# Patient Record
Sex: MALE | Race: WHITE | Employment: OTHER | URBAN - METROPOLITAN AREA
[De-identification: names, ages, dates, MRNs, and addresses within clinical notes are randomized per-mention and may not be internally consistent; named-entity substitution may affect disease eponyms.]

---

## 2017-03-20 PROBLEM — E66.9 DIABETES MELLITUS TYPE 2 IN OBESE (HCC): Status: ACTIVE | Noted: 2017-03-20

## 2017-03-20 PROBLEM — R94.31 ABNORMAL EKG: Status: ACTIVE | Noted: 2017-03-20

## 2017-03-20 PROBLEM — E78.2 MIXED HYPERLIPIDEMIA: Status: ACTIVE | Noted: 2017-03-20

## 2017-03-20 PROBLEM — E11.69 DIABETES MELLITUS TYPE 2 IN OBESE (HCC): Status: ACTIVE | Noted: 2017-03-20

## 2017-03-20 PROBLEM — Z01.810 PREOPERATIVE CARDIOVASCULAR EXAMINATION: Status: ACTIVE | Noted: 2017-03-20

## 2019-04-17 PROBLEM — I48.0 PAF (PAROXYSMAL ATRIAL FIBRILLATION) (HCC): Status: ACTIVE | Noted: 2019-04-17

## 2019-04-17 PROBLEM — Z99.89 OSA ON CPAP: Status: ACTIVE | Noted: 2019-04-17

## 2019-04-17 PROBLEM — G47.33 OSA ON CPAP: Status: ACTIVE | Noted: 2019-04-17

## 2020-01-01 ENCOUNTER — APPOINTMENT (OUTPATIENT)
Dept: ULTRASOUND IMAGING | Facility: HOSPITAL | Age: 65
DRG: 682 | End: 2020-01-01
Attending: HOSPITALIST
Payer: COMMERCIAL

## 2020-01-01 ENCOUNTER — TELEPHONE (OUTPATIENT)
Dept: HEMATOLOGY/ONCOLOGY | Facility: HOSPITAL | Age: 65
End: 2020-01-01

## 2020-01-01 ENCOUNTER — APPOINTMENT (OUTPATIENT)
Dept: GENERAL RADIOLOGY | Facility: HOSPITAL | Age: 65
DRG: 435 | End: 2020-01-01
Attending: HOSPITALIST
Payer: COMMERCIAL

## 2020-01-01 ENCOUNTER — APPOINTMENT (OUTPATIENT)
Dept: GENETICS | Facility: HOSPITAL | Age: 65
End: 2020-01-01
Payer: COMMERCIAL

## 2020-01-01 ENCOUNTER — LAB REQUISITION (OUTPATIENT)
Dept: LAB | Facility: HOSPITAL | Age: 65
End: 2020-01-01
Payer: COMMERCIAL

## 2020-01-01 ENCOUNTER — HOSPITAL ENCOUNTER (INPATIENT)
Facility: HOSPITAL | Age: 65
LOS: 13 days | Discharge: HOME HEALTH | DRG: 682 | End: 2020-01-01
Attending: EMERGENCY MEDICINE | Admitting: HOSPITALIST
Payer: COMMERCIAL

## 2020-01-01 ENCOUNTER — APPOINTMENT (OUTPATIENT)
Dept: ULTRASOUND IMAGING | Facility: HOSPITAL | Age: 65
DRG: 871 | End: 2020-01-01
Attending: INTERNAL MEDICINE
Payer: COMMERCIAL

## 2020-01-01 ENCOUNTER — HOSPITAL ENCOUNTER (INPATIENT)
Facility: HOSPITAL | Age: 65
LOS: 7 days | Discharge: HOME HEALTH | DRG: 435 | End: 2020-01-01
Attending: EMERGENCY MEDICINE | Admitting: HOSPITALIST
Payer: COMMERCIAL

## 2020-01-01 ENCOUNTER — HOSPITAL ENCOUNTER (INPATIENT)
Facility: HOSPITAL | Age: 65
LOS: 4 days | Discharge: HOME | DRG: 871 | End: 2020-01-01
Attending: EMERGENCY MEDICINE | Admitting: HOSPITALIST
Payer: COMMERCIAL

## 2020-01-01 ENCOUNTER — APPOINTMENT (OUTPATIENT)
Dept: GENERAL RADIOLOGY | Facility: HOSPITAL | Age: 65
DRG: 682 | End: 2020-01-01
Attending: INTERNAL MEDICINE
Payer: COMMERCIAL

## 2020-01-01 ENCOUNTER — APPOINTMENT (OUTPATIENT)
Dept: CT IMAGING | Facility: HOSPITAL | Age: 65
DRG: 682 | End: 2020-01-01
Attending: HOSPITALIST
Payer: COMMERCIAL

## 2020-01-01 ENCOUNTER — APPOINTMENT (OUTPATIENT)
Dept: INTERVENTIONAL RADIOLOGY/VASCULAR | Facility: HOSPITAL | Age: 65
DRG: 435 | End: 2020-01-01
Attending: CLINICAL NURSE SPECIALIST
Payer: COMMERCIAL

## 2020-01-01 ENCOUNTER — LAB ENCOUNTER (OUTPATIENT)
Dept: LAB | Age: 65
End: 2020-01-01
Attending: INTERNAL MEDICINE
Payer: COMMERCIAL

## 2020-01-01 ENCOUNTER — NURSE ONLY (OUTPATIENT)
Dept: HEMATOLOGY/ONCOLOGY | Facility: HOSPITAL | Age: 65
End: 2020-01-01
Payer: COMMERCIAL

## 2020-01-01 ENCOUNTER — GENETICS ENCOUNTER (OUTPATIENT)
Dept: GENETICS | Facility: HOSPITAL | Age: 65
End: 2020-01-01
Payer: COMMERCIAL

## 2020-01-01 VITALS
HEIGHT: 75 IN | DIASTOLIC BLOOD PRESSURE: 61 MMHG | OXYGEN SATURATION: 97 % | RESPIRATION RATE: 18 BRPM | HEART RATE: 82 BPM | WEIGHT: 209.88 LBS | SYSTOLIC BLOOD PRESSURE: 105 MMHG | BODY MASS INDEX: 26.1 KG/M2 | TEMPERATURE: 98 F

## 2020-01-01 VITALS
HEIGHT: 75 IN | BODY MASS INDEX: 26.49 KG/M2 | OXYGEN SATURATION: 96 % | DIASTOLIC BLOOD PRESSURE: 92 MMHG | WEIGHT: 213.06 LBS | HEART RATE: 77 BPM | SYSTOLIC BLOOD PRESSURE: 150 MMHG | RESPIRATION RATE: 18 BRPM | TEMPERATURE: 98 F

## 2020-01-01 VITALS
SYSTOLIC BLOOD PRESSURE: 106 MMHG | TEMPERATURE: 99 F | HEIGHT: 75 IN | RESPIRATION RATE: 16 BRPM | WEIGHT: 214.31 LBS | DIASTOLIC BLOOD PRESSURE: 57 MMHG | HEART RATE: 73 BPM | BODY MASS INDEX: 26.65 KG/M2 | OXYGEN SATURATION: 96 %

## 2020-01-01 DIAGNOSIS — L03.116 CELLULITIS OF LEFT LOWER EXTREMITY: Primary | ICD-10-CM

## 2020-01-01 DIAGNOSIS — R63.8 DECREASED ORAL INTAKE: ICD-10-CM

## 2020-01-01 DIAGNOSIS — R52 INTRACTABLE PAIN: Primary | ICD-10-CM

## 2020-01-01 DIAGNOSIS — N30.00 ACUTE CYSTITIS WITHOUT HEMATURIA: ICD-10-CM

## 2020-01-01 DIAGNOSIS — L03.119 CELLULITIS OF LOWER EXTREMITY, UNSPECIFIED LATERALITY: ICD-10-CM

## 2020-01-01 DIAGNOSIS — Z79.2 LONG TERM (CURRENT) USE OF ANTIBIOTICS: ICD-10-CM

## 2020-01-01 DIAGNOSIS — C25.9 MALIGNANT NEOPLASM OF PANCREAS, UNSPECIFIED LOCATION OF MALIGNANCY (HCC): ICD-10-CM

## 2020-01-01 DIAGNOSIS — D64.9 ANEMIA, UNSPECIFIED TYPE: ICD-10-CM

## 2020-01-01 DIAGNOSIS — R18.0 ASCITES, MALIGNANT: ICD-10-CM

## 2020-01-01 DIAGNOSIS — N17.9 ACUTE KIDNEY INJURY (HCC): ICD-10-CM

## 2020-01-01 DIAGNOSIS — N17.9 ACUTE KIDNEY FAILURE, UNSPECIFIED (HCC): Primary | ICD-10-CM

## 2020-01-01 DIAGNOSIS — N17.9 AKI (ACUTE KIDNEY INJURY) (HCC): Primary | ICD-10-CM

## 2020-01-01 LAB
ALBUMIN SERPL-MCNC: 1.5 G/DL (ref 3.4–5)
ALBUMIN SERPL-MCNC: 1.6 G/DL (ref 3.4–5)
ALBUMIN SERPL-MCNC: 1.7 G/DL (ref 3.4–5)
ALBUMIN SERPL-MCNC: 1.9 G/DL (ref 3.4–5)
ALBUMIN SERPL-MCNC: 2 G/DL (ref 3.4–5)
ALBUMIN SERPL-MCNC: 2 G/DL (ref 3.4–5)
ALBUMIN SERPL-MCNC: 2.1 G/DL (ref 3.4–5)
ALBUMIN SERPL-MCNC: 2.2 G/DL (ref 3.4–5)
ALBUMIN SERPL-MCNC: 2.2 G/DL (ref 3.4–5)
ALBUMIN SERPL-MCNC: 2.3 G/DL (ref 3.4–5)
ALBUMIN SERPL-MCNC: 2.3 G/DL (ref 3.4–5)
ALBUMIN SERPL-MCNC: 2.4 G/DL (ref 3.4–5)
ALBUMIN SERPL-MCNC: 2.5 G/DL (ref 3.4–5)
ALBUMIN SERPL-MCNC: 2.5 G/DL (ref 3.4–5)
ALBUMIN/GLOB SERPL: 0.4 {RATIO} (ref 1–2)
ALBUMIN/GLOB SERPL: 0.4 {RATIO} (ref 1–2)
ALBUMIN/GLOB SERPL: 0.5 {RATIO} (ref 1–2)
ALBUMIN/GLOB SERPL: 0.6 {RATIO} (ref 1–2)
ALBUMIN/GLOB SERPL: 0.7 {RATIO} (ref 1–2)
ALP LIVER SERPL-CCNC: 238 U/L (ref 45–117)
ALP LIVER SERPL-CCNC: 239 U/L (ref 45–117)
ALP LIVER SERPL-CCNC: 300 U/L (ref 45–117)
ALP LIVER SERPL-CCNC: 313 U/L (ref 45–117)
ALP LIVER SERPL-CCNC: 370 U/L (ref 45–117)
ALP LIVER SERPL-CCNC: 397 U/L (ref 45–117)
ALP LIVER SERPL-CCNC: 479 U/L (ref 45–117)
ALP LIVER SERPL-CCNC: 521 U/L (ref 45–117)
ALP LIVER SERPL-CCNC: 663 U/L (ref 45–117)
ALT SERPL-CCNC: 13 U/L (ref 16–61)
ALT SERPL-CCNC: 13 U/L (ref 16–61)
ALT SERPL-CCNC: 27 U/L (ref 16–61)
ALT SERPL-CCNC: 30 U/L (ref 16–61)
ALT SERPL-CCNC: 35 U/L (ref 16–61)
ALT SERPL-CCNC: 36 U/L (ref 16–61)
ALT SERPL-CCNC: 36 U/L (ref 16–61)
ALT SERPL-CCNC: 38 U/L (ref 16–61)
ALT SERPL-CCNC: 47 U/L (ref 16–61)
ANION GAP SERPL CALC-SCNC: 10 MMOL/L (ref 0–18)
ANION GAP SERPL CALC-SCNC: 11 MMOL/L (ref 0–18)
ANION GAP SERPL CALC-SCNC: 12 MMOL/L (ref 0–18)
ANION GAP SERPL CALC-SCNC: 5 MMOL/L (ref 0–18)
ANION GAP SERPL CALC-SCNC: 6 MMOL/L (ref 0–18)
ANION GAP SERPL CALC-SCNC: 6 MMOL/L (ref 0–18)
ANION GAP SERPL CALC-SCNC: 7 MMOL/L (ref 0–18)
ANION GAP SERPL CALC-SCNC: 8 MMOL/L (ref 0–18)
ANION GAP SERPL CALC-SCNC: 9 MMOL/L (ref 0–18)
ANTIBODY SCREEN: NEGATIVE
APTT PPP: 101.4 SECONDS (ref 23.2–35.3)
APTT PPP: 127.9 SECONDS (ref 23.2–35.3)
APTT PPP: 150.4 SECONDS (ref 23.2–35.3)
APTT PPP: 153.3 SECONDS (ref 23.2–35.3)
APTT PPP: 40.5 SECONDS (ref 23.2–35.3)
APTT PPP: 41.1 SECONDS (ref 23.2–35.3)
APTT PPP: 52.7 SECONDS (ref 23.2–35.3)
APTT PPP: 66.4 SECONDS (ref 23.2–35.3)
APTT PPP: 71.5 SECONDS (ref 23.2–35.3)
APTT PPP: 71.7 SECONDS (ref 23.2–35.3)
APTT PPP: 73.7 SECONDS (ref 23.2–35.3)
APTT PPP: 74 SECONDS (ref 23.2–35.3)
APTT PPP: 78.9 SECONDS (ref 23.2–35.3)
APTT PPP: 83.6 SECONDS (ref 23.2–35.3)
APTT PPP: 87.8 SECONDS (ref 23.2–35.3)
APTT PPP: 92 SECONDS (ref 23.2–35.3)
APTT PPP: 93.2 SECONDS (ref 23.2–35.3)
APTT PPP: 93.8 SECONDS (ref 23.2–35.3)
APTT PPP: 94.5 SECONDS (ref 23.2–35.3)
APTT PPP: 94.6 SECONDS (ref 23.2–35.3)
AST SERPL-CCNC: 18 U/L (ref 15–37)
AST SERPL-CCNC: 25 U/L (ref 15–37)
AST SERPL-CCNC: 29 U/L (ref 15–37)
AST SERPL-CCNC: 34 U/L (ref 15–37)
AST SERPL-CCNC: 39 U/L (ref 15–37)
AST SERPL-CCNC: 46 U/L (ref 15–37)
AST SERPL-CCNC: 50 U/L (ref 15–37)
AST SERPL-CCNC: 50 U/L (ref 15–37)
AST SERPL-CCNC: 65 U/L (ref 15–37)
BACTERIA UR QL AUTO: NEGATIVE /HPF
BASOPHILS # BLD AUTO: 0 X10(3) UL (ref 0–0.2)
BASOPHILS # BLD AUTO: 0.01 X10(3) UL (ref 0–0.2)
BASOPHILS # BLD AUTO: 0.02 X10(3) UL (ref 0–0.2)
BASOPHILS # BLD AUTO: 0.03 X10(3) UL (ref 0–0.2)
BASOPHILS # BLD AUTO: 0.03 X10(3) UL (ref 0–0.2)
BASOPHILS # BLD AUTO: 0.04 X10(3) UL (ref 0–0.2)
BASOPHILS # BLD AUTO: 0.05 X10(3) UL (ref 0–0.2)
BASOPHILS # BLD AUTO: 0.06 X10(3) UL (ref 0–0.2)
BASOPHILS # BLD AUTO: 0.07 X10(3) UL (ref 0–0.2)
BASOPHILS NFR BLD AUTO: 0 %
BASOPHILS NFR BLD AUTO: 0.1 %
BASOPHILS NFR BLD AUTO: 0.3 %
BASOPHILS NFR BLD AUTO: 0.4 %
BASOPHILS NFR BLD AUTO: 0.4 %
BASOPHILS NFR BLD AUTO: 0.6 %
BASOPHILS NFR BLD AUTO: 0.7 %
BASOPHILS NFR BLD AUTO: 0.7 %
BASOPHILS NFR BLD AUTO: 0.8 %
BASOPHILS NFR BLD AUTO: 0.8 %
BASOPHILS NFR BLD AUTO: 0.9 %
BASOPHILS NFR BLD AUTO: 1 %
BASOPHILS NFR BLD AUTO: 1 %
BASOPHILS NFR BLD AUTO: 1.1 %
BASOPHILS NFR BLD AUTO: 1.1 %
BASOPHILS NFR BLD AUTO: 1.2 %
BASOPHILS NFR BLD AUTO: 1.3 %
BASOPHILS NFR BLD AUTO: 1.5 %
BASOPHILS NFR FLD: 0 %
BASOPHILS NFR FLD: 0 %
BILIRUB DIRECT SERPL-MCNC: 0.5 MG/DL (ref 0–0.2)
BILIRUB SERPL-MCNC: 0.6 MG/DL (ref 0.1–2)
BILIRUB SERPL-MCNC: 0.6 MG/DL (ref 0.1–2)
BILIRUB SERPL-MCNC: 0.7 MG/DL (ref 0.1–2)
BILIRUB SERPL-MCNC: 0.9 MG/DL (ref 0.1–2)
BILIRUB SERPL-MCNC: 1.1 MG/DL (ref 0.1–2)
BILIRUB SERPL-MCNC: 1.2 MG/DL (ref 0.1–2)
BILIRUB SERPL-MCNC: 1.3 MG/DL (ref 0.1–2)
BILIRUB SERPL-MCNC: 1.4 MG/DL (ref 0.1–2)
BILIRUB SERPL-MCNC: 1.5 MG/DL (ref 0.1–2)
BILIRUB UR QL: NEGATIVE
BLOOD TYPE BARCODE: 5100
BLOOD TYPE BARCODE: 9500
BUN BLD-MCNC: 18 MG/DL (ref 7–18)
BUN BLD-MCNC: 18 MG/DL (ref 7–18)
BUN BLD-MCNC: 25 MG/DL (ref 7–18)
BUN BLD-MCNC: 26 MG/DL (ref 7–18)
BUN BLD-MCNC: 28 MG/DL (ref 7–18)
BUN BLD-MCNC: 36 MG/DL (ref 7–18)
BUN BLD-MCNC: 37 MG/DL (ref 7–18)
BUN BLD-MCNC: 38 MG/DL (ref 7–18)
BUN BLD-MCNC: 38 MG/DL (ref 7–18)
BUN BLD-MCNC: 39 MG/DL (ref 7–18)
BUN BLD-MCNC: 41 MG/DL (ref 7–18)
BUN BLD-MCNC: 43 MG/DL (ref 7–18)
BUN BLD-MCNC: 45 MG/DL (ref 7–18)
BUN BLD-MCNC: 45 MG/DL (ref 7–18)
BUN BLD-MCNC: 48 MG/DL (ref 7–18)
BUN BLD-MCNC: 49 MG/DL (ref 7–18)
BUN BLD-MCNC: 53 MG/DL (ref 7–18)
BUN BLD-MCNC: 54 MG/DL (ref 7–18)
BUN BLD-MCNC: 56 MG/DL (ref 7–18)
BUN/CREAT SERPL: 10.5 (ref 10–20)
BUN/CREAT SERPL: 10.7 (ref 10–20)
BUN/CREAT SERPL: 11.4 (ref 10–20)
BUN/CREAT SERPL: 12.3 (ref 10–20)
BUN/CREAT SERPL: 12.6 (ref 10–20)
BUN/CREAT SERPL: 12.8 (ref 10–20)
BUN/CREAT SERPL: 14.5 (ref 10–20)
BUN/CREAT SERPL: 14.5 (ref 10–20)
BUN/CREAT SERPL: 14.9 (ref 10–20)
BUN/CREAT SERPL: 15.8 (ref 10–20)
BUN/CREAT SERPL: 15.8 (ref 10–20)
BUN/CREAT SERPL: 17 (ref 10–20)
BUN/CREAT SERPL: 19.4 (ref 10–20)
BUN/CREAT SERPL: 19.6 (ref 10–20)
BUN/CREAT SERPL: 19.7 (ref 10–20)
BUN/CREAT SERPL: 20.3 (ref 10–20)
BUN/CREAT SERPL: 20.3 (ref 10–20)
BUN/CREAT SERPL: 23.4 (ref 10–20)
BUN/CREAT SERPL: 30.1 (ref 10–20)
BUN/CREAT SERPL: 33.3 (ref 10–20)
BUN/CREAT SERPL: 8.1 (ref 10–20)
BUN/CREAT SERPL: 8.1 (ref 10–20)
BUN/CREAT SERPL: 8.2 (ref 10–20)
BUN/CREAT SERPL: 8.3 (ref 10–20)
BUN/CREAT SERPL: 8.6 (ref 10–20)
BUN/CREAT SERPL: 8.7 (ref 10–20)
BUN/CREAT SERPL: 8.9 (ref 10–20)
BUN/CREAT SERPL: 8.9 (ref 10–20)
BUN/CREAT SERPL: 9.3 (ref 10–20)
BUN/CREAT SERPL: 9.4 (ref 10–20)
BUN/CREAT SERPL: 9.7 (ref 10–20)
BUN/CREAT SERPL: 9.9 (ref 10–20)
C DIFF TOX B STL QL: NEGATIVE
C DIFF TOX B STL QL: POSITIVE
CALCIUM BLD-MCNC: 7.7 MG/DL (ref 8.5–10.1)
CALCIUM BLD-MCNC: 7.8 MG/DL (ref 8.5–10.1)
CALCIUM BLD-MCNC: 7.8 MG/DL (ref 8.5–10.1)
CALCIUM BLD-MCNC: 7.9 MG/DL (ref 8.5–10.1)
CALCIUM BLD-MCNC: 8 MG/DL (ref 8.5–10.1)
CALCIUM BLD-MCNC: 8.1 MG/DL (ref 8.5–10.1)
CALCIUM BLD-MCNC: 8.2 MG/DL (ref 8.5–10.1)
CALCIUM BLD-MCNC: 8.3 MG/DL (ref 8.5–10.1)
CALCIUM BLD-MCNC: 8.4 MG/DL (ref 8.5–10.1)
CALCIUM BLD-MCNC: 8.5 MG/DL (ref 8.5–10.1)
CALCIUM BLD-MCNC: 9.1 MG/DL (ref 8.5–10.1)
CANCER AG19-9 SERPL-ACNC: ABNORMAL U/ML (ref ?–37)
CHLORIDE SERPL-SCNC: 100 MMOL/L (ref 98–112)
CHLORIDE SERPL-SCNC: 101 MMOL/L (ref 98–112)
CHLORIDE SERPL-SCNC: 102 MMOL/L (ref 98–112)
CHLORIDE SERPL-SCNC: 103 MMOL/L (ref 98–112)
CHLORIDE SERPL-SCNC: 104 MMOL/L (ref 98–112)
CHLORIDE SERPL-SCNC: 105 MMOL/L (ref 98–112)
CHLORIDE SERPL-SCNC: 105 MMOL/L (ref 98–112)
CHLORIDE SERPL-SCNC: 106 MMOL/L (ref 98–112)
CHLORIDE SERPL-SCNC: 107 MMOL/L (ref 98–112)
CHLORIDE SERPL-SCNC: 108 MMOL/L (ref 98–112)
CHLORIDE SERPL-SCNC: 109 MMOL/L (ref 98–112)
CHLORIDE SERPL-SCNC: 110 MMOL/L (ref 98–112)
CHLORIDE SERPL-SCNC: 99 MMOL/L (ref 98–112)
CHLORIDE SERPL-SCNC: 99 MMOL/L (ref 98–112)
CHLORIDE UR-SCNC: 15 MMOL/L
CO2 SERPL-SCNC: 16 MMOL/L (ref 21–32)
CO2 SERPL-SCNC: 18 MMOL/L (ref 21–32)
CO2 SERPL-SCNC: 19 MMOL/L (ref 21–32)
CO2 SERPL-SCNC: 19 MMOL/L (ref 21–32)
CO2 SERPL-SCNC: 20 MMOL/L (ref 21–32)
CO2 SERPL-SCNC: 21 MMOL/L (ref 21–32)
CO2 SERPL-SCNC: 23 MMOL/L (ref 21–32)
CO2 SERPL-SCNC: 24 MMOL/L (ref 21–32)
CO2 SERPL-SCNC: 25 MMOL/L (ref 21–32)
CO2 SERPL-SCNC: 26 MMOL/L (ref 21–32)
CO2 SERPL-SCNC: 27 MMOL/L (ref 21–32)
COLOR FLD: YELLOW
COLOR FLD: YELLOW
COLOR UR: YELLOW
CREAT BLD-MCNC: 0.54 MG/DL (ref 0.7–1.3)
CREAT BLD-MCNC: 0.77 MG/DL (ref 0.7–1.3)
CREAT BLD-MCNC: 0.83 MG/DL (ref 0.7–1.3)
CREAT BLD-MCNC: 1.28 MG/DL (ref 0.7–1.3)
CREAT BLD-MCNC: 2.27 MG/DL (ref 0.7–1.3)
CREAT BLD-MCNC: 2.44 MG/DL (ref 0.7–1.3)
CREAT BLD-MCNC: 2.66 MG/DL
CREAT BLD-MCNC: 2.73 MG/DL (ref 0.7–1.3)
CREAT BLD-MCNC: 2.83 MG/DL (ref 0.7–1.3)
CREAT BLD-MCNC: 2.83 MG/DL (ref 0.7–1.3)
CREAT BLD-MCNC: 2.85 MG/DL (ref 0.7–1.3)
CREAT BLD-MCNC: 2.88 MG/DL (ref 0.7–1.3)
CREAT BLD-MCNC: 2.89 MG/DL (ref 0.7–1.3)
CREAT BLD-MCNC: 3.1 MG/DL (ref 0.7–1.3)
CREAT BLD-MCNC: 3.15 MG/DL (ref 0.7–1.3)
CREAT BLD-MCNC: 3.21 MG/DL (ref 0.7–1.3)
CREAT BLD-MCNC: 3.33 MG/DL (ref 0.7–1.3)
CREAT BLD-MCNC: 3.45 MG/DL (ref 0.7–1.3)
CREAT BLD-MCNC: 3.52 MG/DL (ref 0.7–1.3)
CREAT BLD-MCNC: 3.81 MG/DL (ref 0.7–1.3)
CREAT BLD-MCNC: 3.92 MG/DL (ref 0.7–1.3)
CREAT BLD-MCNC: 4.04 MG/DL (ref 0.7–1.3)
CREAT BLD-MCNC: 4.14 MG/DL (ref 0.7–1.3)
CREAT BLD-MCNC: 4.2 MG/DL (ref 0.7–1.3)
CREAT BLD-MCNC: 4.2 MG/DL (ref 0.7–1.3)
CREAT BLD-MCNC: 4.37 MG/DL (ref 0.7–1.3)
CREAT BLD-MCNC: 4.46 MG/DL (ref 0.7–1.3)
CREAT BLD-MCNC: 4.48 MG/DL (ref 0.7–1.3)
CREAT BLD-MCNC: 4.57 MG/DL (ref 0.7–1.3)
CREAT BLD-MCNC: 4.57 MG/DL (ref 0.7–1.3)
CREAT BLD-MCNC: 4.75 MG/DL (ref 0.7–1.3)
CREAT UR-SCNC: 156 MG/DL
CREAT UR-SCNC: 184 MG/DL
CREAT UR-SCNC: 206 MG/DL
CRP SERPL-MCNC: 2.47 MG/DL (ref ?–0.3)
DEPRECATED RDW RBC AUTO: 55.4 FL (ref 35.1–46.3)
DEPRECATED RDW RBC AUTO: 57.1 FL (ref 35.1–46.3)
DEPRECATED RDW RBC AUTO: 59.7 FL (ref 35.1–46.3)
DEPRECATED RDW RBC AUTO: 61.2 FL (ref 35.1–46.3)
DEPRECATED RDW RBC AUTO: 63.7 FL (ref 35.1–46.3)
DEPRECATED RDW RBC AUTO: 63.8 FL (ref 35.1–46.3)
DEPRECATED RDW RBC AUTO: 64 FL (ref 35.1–46.3)
DEPRECATED RDW RBC AUTO: 64 FL (ref 35.1–46.3)
DEPRECATED RDW RBC AUTO: 64.3 FL (ref 35.1–46.3)
DEPRECATED RDW RBC AUTO: 64.3 FL (ref 35.1–46.3)
DEPRECATED RDW RBC AUTO: 64.5 FL (ref 35.1–46.3)
DEPRECATED RDW RBC AUTO: 64.6 FL (ref 35.1–46.3)
DEPRECATED RDW RBC AUTO: 64.6 FL (ref 35.1–46.3)
DEPRECATED RDW RBC AUTO: 64.7 FL (ref 35.1–46.3)
DEPRECATED RDW RBC AUTO: 64.9 FL (ref 35.1–46.3)
DEPRECATED RDW RBC AUTO: 64.9 FL (ref 35.1–46.3)
DEPRECATED RDW RBC AUTO: 65.1 FL (ref 35.1–46.3)
DEPRECATED RDW RBC AUTO: 66.3 FL (ref 35.1–46.3)
DEPRECATED RDW RBC AUTO: 66.4 FL (ref 35.1–46.3)
DEPRECATED RDW RBC AUTO: 66.5 FL (ref 35.1–46.3)
DEPRECATED RDW RBC AUTO: 66.6 FL (ref 35.1–46.3)
DEPRECATED RDW RBC AUTO: 66.8 FL (ref 35.1–46.3)
DEPRECATED RDW RBC AUTO: 66.8 FL (ref 35.1–46.3)
EOSINOPHIL # BLD AUTO: 0.01 X10(3) UL (ref 0–0.7)
EOSINOPHIL # BLD AUTO: 0.03 X10(3) UL (ref 0–0.7)
EOSINOPHIL # BLD AUTO: 0.04 X10(3) UL (ref 0–0.7)
EOSINOPHIL # BLD AUTO: 0.04 X10(3) UL (ref 0–0.7)
EOSINOPHIL # BLD AUTO: 0.05 X10(3) UL (ref 0–0.7)
EOSINOPHIL # BLD AUTO: 0.05 X10(3) UL (ref 0–0.7)
EOSINOPHIL # BLD AUTO: 0.06 X10(3) UL (ref 0–0.7)
EOSINOPHIL # BLD AUTO: 0.08 X10(3) UL (ref 0–0.7)
EOSINOPHIL # BLD AUTO: 0.09 X10(3) UL (ref 0–0.7)
EOSINOPHIL # BLD AUTO: 0.1 X10(3) UL (ref 0–0.7)
EOSINOPHIL # BLD AUTO: 0.14 X10(3) UL (ref 0–0.7)
EOSINOPHIL # BLD AUTO: 0.15 X10(3) UL (ref 0–0.7)
EOSINOPHIL # BLD AUTO: 0.16 X10(3) UL (ref 0–0.7)
EOSINOPHIL # BLD AUTO: 0.17 X10(3) UL (ref 0–0.7)
EOSINOPHIL # BLD AUTO: 0.18 X10(3) UL (ref 0–0.7)
EOSINOPHIL # BLD AUTO: 0.19 X10(3) UL (ref 0–0.7)
EOSINOPHIL # BLD AUTO: 0.2 X10(3) UL (ref 0–0.7)
EOSINOPHIL # BLD AUTO: 0.25 X10(3) UL (ref 0–0.7)
EOSINOPHIL # BLD AUTO: 0.25 X10(3) UL (ref 0–0.7)
EOSINOPHIL # BLD AUTO: 0.27 X10(3) UL (ref 0–0.7)
EOSINOPHIL NFR BLD AUTO: 0.1 %
EOSINOPHIL NFR BLD AUTO: 0.1 %
EOSINOPHIL NFR BLD AUTO: 0.3 %
EOSINOPHIL NFR BLD AUTO: 0.6 %
EOSINOPHIL NFR BLD AUTO: 0.6 %
EOSINOPHIL NFR BLD AUTO: 1 %
EOSINOPHIL NFR BLD AUTO: 1.1 %
EOSINOPHIL NFR BLD AUTO: 1.6 %
EOSINOPHIL NFR BLD AUTO: 1.7 %
EOSINOPHIL NFR BLD AUTO: 1.9 %
EOSINOPHIL NFR BLD AUTO: 2.1 %
EOSINOPHIL NFR BLD AUTO: 3.3 %
EOSINOPHIL NFR BLD AUTO: 3.5 %
EOSINOPHIL NFR BLD AUTO: 3.6 %
EOSINOPHIL NFR BLD AUTO: 3.7 %
EOSINOPHIL NFR BLD AUTO: 3.9 %
EOSINOPHIL NFR BLD AUTO: 4.2 %
EOSINOPHIL NFR BLD AUTO: 4.6 %
EOSINOPHIL NFR BLD AUTO: 4.8 %
EOSINOPHIL NFR BLD AUTO: 5.2 %
EOSINOPHIL NFR BLD AUTO: 5.9 %
EOSINOPHIL NFR BLD AUTO: 6.6 %
EOSINOPHIL NFR FLD: 0 %
EOSINOPHIL NFR FLD: 0 %
ERYTHROCYTE [DISTWIDTH] IN BLOOD BY AUTOMATED COUNT: 16.8 % (ref 11–15)
ERYTHROCYTE [DISTWIDTH] IN BLOOD BY AUTOMATED COUNT: 17.1 % (ref 11–15)
ERYTHROCYTE [DISTWIDTH] IN BLOOD BY AUTOMATED COUNT: 17.7 % (ref 11–15)
ERYTHROCYTE [DISTWIDTH] IN BLOOD BY AUTOMATED COUNT: 17.9 % (ref 11–15)
ERYTHROCYTE [DISTWIDTH] IN BLOOD BY AUTOMATED COUNT: 18.6 % (ref 11–15)
ERYTHROCYTE [DISTWIDTH] IN BLOOD BY AUTOMATED COUNT: 18.8 % (ref 11–15)
ERYTHROCYTE [DISTWIDTH] IN BLOOD BY AUTOMATED COUNT: 18.9 % (ref 11–15)
ERYTHROCYTE [DISTWIDTH] IN BLOOD BY AUTOMATED COUNT: 19 % (ref 11–15)
ERYTHROCYTE [DISTWIDTH] IN BLOOD BY AUTOMATED COUNT: 19.1 % (ref 11–15)
ERYTHROCYTE [DISTWIDTH] IN BLOOD BY AUTOMATED COUNT: 19.3 % (ref 11–15)
ERYTHROCYTE [DISTWIDTH] IN BLOOD BY AUTOMATED COUNT: 19.5 % (ref 11–15)
ERYTHROCYTE [DISTWIDTH] IN BLOOD BY AUTOMATED COUNT: 19.5 % (ref 11–15)
ERYTHROCYTE [DISTWIDTH] IN BLOOD BY AUTOMATED COUNT: 19.6 % (ref 11–15)
ERYTHROCYTE [DISTWIDTH] IN BLOOD BY AUTOMATED COUNT: 19.6 % (ref 11–15)
ERYTHROCYTE [DISTWIDTH] IN BLOOD BY AUTOMATED COUNT: 19.8 % (ref 11–15)
GLOBULIN PLAS-MCNC: 3.2 G/DL (ref 2.8–4.4)
GLOBULIN PLAS-MCNC: 3.6 G/DL (ref 2.8–4.4)
GLOBULIN PLAS-MCNC: 3.7 G/DL (ref 2.8–4.4)
GLOBULIN PLAS-MCNC: 3.8 G/DL (ref 2.8–4.4)
GLOBULIN PLAS-MCNC: 3.8 G/DL (ref 2.8–4.4)
GLOBULIN PLAS-MCNC: 4 G/DL (ref 2.8–4.4)
GLOBULIN PLAS-MCNC: 4.1 G/DL (ref 2.8–4.4)
GLOBULIN PLAS-MCNC: 4.2 G/DL (ref 2.8–4.4)
GLUCOSE BLD-MCNC: 109 MG/DL (ref 70–99)
GLUCOSE BLD-MCNC: 110 MG/DL (ref 70–99)
GLUCOSE BLD-MCNC: 122 MG/DL (ref 70–99)
GLUCOSE BLD-MCNC: 128 MG/DL (ref 70–99)
GLUCOSE BLD-MCNC: 140 MG/DL (ref 70–99)
GLUCOSE BLD-MCNC: 149 MG/DL (ref 70–99)
GLUCOSE BLD-MCNC: 149 MG/DL (ref 70–99)
GLUCOSE BLD-MCNC: 154 MG/DL (ref 70–99)
GLUCOSE BLD-MCNC: 157 MG/DL (ref 70–99)
GLUCOSE BLD-MCNC: 157 MG/DL (ref 70–99)
GLUCOSE BLD-MCNC: 162 MG/DL (ref 70–99)
GLUCOSE BLD-MCNC: 165 MG/DL (ref 70–99)
GLUCOSE BLD-MCNC: 170 MG/DL (ref 70–99)
GLUCOSE BLD-MCNC: 173 MG/DL (ref 70–99)
GLUCOSE BLD-MCNC: 176 MG/DL (ref 70–99)
GLUCOSE BLD-MCNC: 187 MG/DL (ref 70–99)
GLUCOSE BLD-MCNC: 191 MG/DL (ref 70–99)
GLUCOSE BLD-MCNC: 200 MG/DL (ref 70–99)
GLUCOSE BLD-MCNC: 204 MG/DL (ref 70–99)
GLUCOSE BLD-MCNC: 228 MG/DL (ref 70–99)
GLUCOSE BLD-MCNC: 230 MG/DL (ref 70–99)
GLUCOSE BLD-MCNC: 242 MG/DL (ref 70–99)
GLUCOSE BLD-MCNC: 51 MG/DL (ref 70–99)
GLUCOSE BLD-MCNC: 59 MG/DL (ref 70–99)
GLUCOSE BLD-MCNC: 79 MG/DL (ref 70–99)
GLUCOSE BLD-MCNC: 82 MG/DL (ref 70–99)
GLUCOSE BLD-MCNC: 83 MG/DL (ref 70–99)
GLUCOSE BLD-MCNC: 94 MG/DL (ref 70–99)
GLUCOSE BLD-MCNC: 97 MG/DL (ref 70–99)
GLUCOSE BLDC GLUCOMTR-MCNC: 102 MG/DL (ref 70–99)
GLUCOSE BLDC GLUCOMTR-MCNC: 106 MG/DL (ref 70–99)
GLUCOSE BLDC GLUCOMTR-MCNC: 116 MG/DL (ref 70–99)
GLUCOSE BLDC GLUCOMTR-MCNC: 117 MG/DL (ref 70–99)
GLUCOSE BLDC GLUCOMTR-MCNC: 130 MG/DL (ref 70–99)
GLUCOSE BLDC GLUCOMTR-MCNC: 153 MG/DL (ref 70–99)
GLUCOSE BLDC GLUCOMTR-MCNC: 154 MG/DL (ref 70–99)
GLUCOSE BLDC GLUCOMTR-MCNC: 156 MG/DL (ref 70–99)
GLUCOSE BLDC GLUCOMTR-MCNC: 157 MG/DL (ref 70–99)
GLUCOSE BLDC GLUCOMTR-MCNC: 164 MG/DL (ref 70–99)
GLUCOSE BLDC GLUCOMTR-MCNC: 175 MG/DL (ref 70–99)
GLUCOSE BLDC GLUCOMTR-MCNC: 175 MG/DL (ref 70–99)
GLUCOSE BLDC GLUCOMTR-MCNC: 177 MG/DL (ref 70–99)
GLUCOSE BLDC GLUCOMTR-MCNC: 178 MG/DL (ref 70–99)
GLUCOSE BLDC GLUCOMTR-MCNC: 179 MG/DL (ref 70–99)
GLUCOSE BLDC GLUCOMTR-MCNC: 183 MG/DL (ref 70–99)
GLUCOSE BLDC GLUCOMTR-MCNC: 185 MG/DL (ref 70–99)
GLUCOSE BLDC GLUCOMTR-MCNC: 185 MG/DL (ref 70–99)
GLUCOSE BLDC GLUCOMTR-MCNC: 186 MG/DL (ref 70–99)
GLUCOSE BLDC GLUCOMTR-MCNC: 187 MG/DL (ref 70–99)
GLUCOSE BLDC GLUCOMTR-MCNC: 190 MG/DL (ref 70–99)
GLUCOSE BLDC GLUCOMTR-MCNC: 194 MG/DL (ref 70–99)
GLUCOSE BLDC GLUCOMTR-MCNC: 203 MG/DL (ref 70–99)
GLUCOSE BLDC GLUCOMTR-MCNC: 208 MG/DL (ref 70–99)
GLUCOSE BLDC GLUCOMTR-MCNC: 210 MG/DL (ref 70–99)
GLUCOSE BLDC GLUCOMTR-MCNC: 219 MG/DL (ref 70–99)
GLUCOSE BLDC GLUCOMTR-MCNC: 238 MG/DL (ref 70–99)
GLUCOSE BLDC GLUCOMTR-MCNC: 246 MG/DL (ref 70–99)
GLUCOSE BLDC GLUCOMTR-MCNC: 252 MG/DL (ref 70–99)
GLUCOSE BLDC GLUCOMTR-MCNC: 257 MG/DL (ref 70–99)
GLUCOSE BLDC GLUCOMTR-MCNC: 261 MG/DL (ref 70–99)
GLUCOSE BLDC GLUCOMTR-MCNC: 277 MG/DL (ref 70–99)
GLUCOSE BLDC GLUCOMTR-MCNC: 311 MG/DL (ref 70–99)
GLUCOSE BLDC GLUCOMTR-MCNC: 60 MG/DL (ref 70–99)
GLUCOSE BLDC GLUCOMTR-MCNC: 66 MG/DL (ref 70–99)
GLUCOSE BLDC GLUCOMTR-MCNC: 77 MG/DL (ref 70–99)
GLUCOSE BLDC GLUCOMTR-MCNC: 78 MG/DL (ref 70–99)
GLUCOSE BLDC GLUCOMTR-MCNC: 78 MG/DL (ref 70–99)
GLUCOSE BLDC GLUCOMTR-MCNC: 79 MG/DL (ref 70–99)
GLUCOSE BLDC GLUCOMTR-MCNC: 94 MG/DL (ref 70–99)
GLUCOSE BLDC GLUCOMTR-MCNC: 95 MG/DL (ref 70–99)
GLUCOSE BLDC GLUCOMTR-MCNC: 97 MG/DL (ref 70–99)
GLUCOSE BLDC GLUCOMTR-MCNC: 97 MG/DL (ref 70–99)
GLUCOSE UR-MCNC: NEGATIVE MG/DL
HAV IGM SER QL: 1.3 MG/DL (ref 1.6–2.6)
HAV IGM SER QL: 1.4 MG/DL (ref 1.6–2.6)
HAV IGM SER QL: 1.5 MG/DL (ref 1.6–2.6)
HAV IGM SER QL: 1.6 MG/DL (ref 1.6–2.6)
HAV IGM SER QL: 1.7 MG/DL (ref 1.6–2.6)
HCT VFR BLD AUTO: 20.4 % (ref 39–53)
HCT VFR BLD AUTO: 20.5 % (ref 39–53)
HCT VFR BLD AUTO: 21.3 % (ref 39–53)
HCT VFR BLD AUTO: 21.3 % (ref 39–53)
HCT VFR BLD AUTO: 21.5 % (ref 39–53)
HCT VFR BLD AUTO: 21.8 % (ref 39–53)
HCT VFR BLD AUTO: 22.1 % (ref 39–53)
HCT VFR BLD AUTO: 22.4 % (ref 39–53)
HCT VFR BLD AUTO: 22.7 % (ref 39–53)
HCT VFR BLD AUTO: 23.1 % (ref 39–53)
HCT VFR BLD AUTO: 23.1 % (ref 39–53)
HCT VFR BLD AUTO: 23.7 % (ref 39–53)
HCT VFR BLD AUTO: 23.7 % (ref 39–53)
HCT VFR BLD AUTO: 23.8 % (ref 39–53)
HCT VFR BLD AUTO: 24.6 % (ref 39–53)
HCT VFR BLD AUTO: 25.3 % (ref 39–53)
HCT VFR BLD AUTO: 25.4 % (ref 39–53)
HCT VFR BLD AUTO: 25.8 % (ref 39–53)
HCT VFR BLD AUTO: 25.9 % (ref 39–53)
HCT VFR BLD AUTO: 28.3 % (ref 39–53)
HCT VFR BLD AUTO: 29.1 % (ref 39–53)
HGB BLD-MCNC: 6.7 G/DL (ref 13–17.5)
HGB BLD-MCNC: 6.9 G/DL (ref 13–17.5)
HGB BLD-MCNC: 7 G/DL (ref 13–17.5)
HGB BLD-MCNC: 7.1 G/DL (ref 13–17.5)
HGB BLD-MCNC: 7.1 G/DL (ref 13–17.5)
HGB BLD-MCNC: 7.2 G/DL (ref 13–17.5)
HGB BLD-MCNC: 7.3 G/DL (ref 13–17.5)
HGB BLD-MCNC: 7.4 G/DL (ref 13–17.5)
HGB BLD-MCNC: 7.4 G/DL (ref 13–17.5)
HGB BLD-MCNC: 7.5 G/DL (ref 13–17.5)
HGB BLD-MCNC: 7.6 G/DL (ref 13–17.5)
HGB BLD-MCNC: 7.7 G/DL (ref 13–17.5)
HGB BLD-MCNC: 7.8 G/DL (ref 13–17.5)
HGB BLD-MCNC: 7.8 G/DL (ref 13–17.5)
HGB BLD-MCNC: 8 G/DL (ref 13–17.5)
HGB BLD-MCNC: 8.1 G/DL (ref 13–17.5)
HGB BLD-MCNC: 8.2 G/DL (ref 13–17.5)
HGB BLD-MCNC: 8.4 G/DL (ref 13–17.5)
HGB BLD-MCNC: 8.5 G/DL (ref 13–17.5)
HGB BLD-MCNC: 9 G/DL (ref 13–17.5)
HGB BLD-MCNC: 9.3 G/DL (ref 13–17.5)
HGB UR QL STRIP.AUTO: NEGATIVE
IMM GRANULOCYTES # BLD AUTO: 0.01 X10(3) UL (ref 0–1)
IMM GRANULOCYTES # BLD AUTO: 0.02 X10(3) UL (ref 0–1)
IMM GRANULOCYTES # BLD AUTO: 0.03 X10(3) UL (ref 0–1)
IMM GRANULOCYTES # BLD AUTO: 0.04 X10(3) UL (ref 0–1)
IMM GRANULOCYTES # BLD AUTO: 0.04 X10(3) UL (ref 0–1)
IMM GRANULOCYTES # BLD AUTO: 0.05 X10(3) UL (ref 0–1)
IMM GRANULOCYTES # BLD AUTO: 0.05 X10(3) UL (ref 0–1)
IMM GRANULOCYTES # BLD AUTO: 0.11 X10(3) UL (ref 0–1)
IMM GRANULOCYTES # BLD AUTO: 0.11 X10(3) UL (ref 0–1)
IMM GRANULOCYTES # BLD AUTO: 0.14 X10(3) UL (ref 0–1)
IMM GRANULOCYTES # BLD AUTO: 0.17 X10(3) UL (ref 0–1)
IMM GRANULOCYTES NFR BLD: 0.2 %
IMM GRANULOCYTES NFR BLD: 0.2 %
IMM GRANULOCYTES NFR BLD: 0.3 %
IMM GRANULOCYTES NFR BLD: 0.4 %
IMM GRANULOCYTES NFR BLD: 0.4 %
IMM GRANULOCYTES NFR BLD: 0.5 %
IMM GRANULOCYTES NFR BLD: 0.6 %
IMM GRANULOCYTES NFR BLD: 0.6 %
IMM GRANULOCYTES NFR BLD: 0.7 %
IMM GRANULOCYTES NFR BLD: 0.8 %
IMM GRANULOCYTES NFR BLD: 0.9 %
IMM GRANULOCYTES NFR BLD: 1 %
IMM GRANULOCYTES NFR BLD: 1.1 %
IMM GRANULOCYTES NFR BLD: 1.3 %
IMM GRANULOCYTES NFR BLD: 1.7 %
IMM GRANULOCYTES NFR BLD: 4.9 %
INR BLD: 1.29 (ref 0.9–1.2)
INR BLD: 1.33 (ref 0.9–1.2)
INR BLD: 1.37 (ref 0.9–1.2)
INR BLD: 1.42 (ref 0.9–1.2)
INR BLD: 1.43 (ref 0.9–1.2)
INR BLD: 1.56 (ref 0.9–1.2)
INR BLD: 1.81 (ref 0.9–1.2)
INR BLD: 1.81 (ref 0.9–1.2)
INR BLD: 1.93 (ref 0.9–1.2)
INR BLD: 2.01 (ref 0.9–1.2)
INR BLD: 2.05 (ref 0.9–1.2)
INR BLD: 2.15 (ref 0.9–1.2)
INR BLD: 2.35 (ref 0.9–1.2)
INR BLD: 2.38 (ref 0.9–1.2)
INR BLD: 3.07 (ref 0.9–1.2)
INR BLD: 3.13 (ref 0.9–1.2)
INR BLD: 3.17 (ref 0.9–1.2)
INR BLD: 3.26 (ref 0.9–1.2)
INR BLD: 3.79 (ref 0.9–1.2)
KETONES UR-MCNC: NEGATIVE MG/DL
LACTATE SERPL-SCNC: 0.9 MMOL/L (ref 0.4–2)
LACTATE SERPL-SCNC: 3 MMOL/L (ref 0.4–2)
LACTATE SERPL-SCNC: 3.1 MMOL/L (ref 0.4–2)
LACTATE SERPL-SCNC: 3.8 MMOL/L (ref 0.4–2)
LIPASE SERPL-CCNC: <10 U/L (ref 73–393)
LYMPHOCYTES # BLD AUTO: 0.17 X10(3) UL (ref 1–4)
LYMPHOCYTES # BLD AUTO: 0.18 X10(3) UL (ref 1–4)
LYMPHOCYTES # BLD AUTO: 0.2 X10(3) UL (ref 1–4)
LYMPHOCYTES # BLD AUTO: 0.2 X10(3) UL (ref 1–4)
LYMPHOCYTES # BLD AUTO: 0.21 X10(3) UL (ref 1–4)
LYMPHOCYTES # BLD AUTO: 0.22 X10(3) UL (ref 1–4)
LYMPHOCYTES # BLD AUTO: 0.23 X10(3) UL (ref 1–4)
LYMPHOCYTES # BLD AUTO: 0.25 X10(3) UL (ref 1–4)
LYMPHOCYTES # BLD AUTO: 0.29 X10(3) UL (ref 1–4)
LYMPHOCYTES # BLD AUTO: 0.3 X10(3) UL (ref 1–4)
LYMPHOCYTES # BLD AUTO: 0.32 X10(3) UL (ref 1–4)
LYMPHOCYTES # BLD AUTO: 0.33 X10(3) UL (ref 1–4)
LYMPHOCYTES # BLD AUTO: 0.36 X10(3) UL (ref 1–4)
LYMPHOCYTES # BLD AUTO: 0.36 X10(3) UL (ref 1–4)
LYMPHOCYTES # BLD AUTO: 0.38 X10(3) UL (ref 1–4)
LYMPHOCYTES # BLD AUTO: 0.42 X10(3) UL (ref 1–4)
LYMPHOCYTES # BLD AUTO: 0.56 X10(3) UL (ref 1–4)
LYMPHOCYTES # BLD AUTO: 0.57 X10(3) UL (ref 1–4)
LYMPHOCYTES # BLD AUTO: 0.59 X10(3) UL (ref 1–4)
LYMPHOCYTES # BLD AUTO: 0.85 X10(3) UL (ref 1–4)
LYMPHOCYTES NFR BLD AUTO: 1.6 %
LYMPHOCYTES NFR BLD AUTO: 1.8 %
LYMPHOCYTES NFR BLD AUTO: 1.9 %
LYMPHOCYTES NFR BLD AUTO: 10.2 %
LYMPHOCYTES NFR BLD AUTO: 10.3 %
LYMPHOCYTES NFR BLD AUTO: 10.7 %
LYMPHOCYTES NFR BLD AUTO: 10.7 %
LYMPHOCYTES NFR BLD AUTO: 12.1 %
LYMPHOCYTES NFR BLD AUTO: 12.4 %
LYMPHOCYTES NFR BLD AUTO: 12.6 %
LYMPHOCYTES NFR BLD AUTO: 13.4 %
LYMPHOCYTES NFR BLD AUTO: 14.1 %
LYMPHOCYTES NFR BLD AUTO: 2.9 %
LYMPHOCYTES NFR BLD AUTO: 3.6 %
LYMPHOCYTES NFR BLD AUTO: 6.6 %
LYMPHOCYTES NFR BLD AUTO: 6.9 %
LYMPHOCYTES NFR BLD AUTO: 7.4 %
LYMPHOCYTES NFR BLD AUTO: 7.5 %
LYMPHOCYTES NFR BLD AUTO: 8.6 %
LYMPHOCYTES NFR BLD AUTO: 8.8 %
LYMPHOCYTES NFR BLD AUTO: 9.4 %
LYMPHOCYTES NFR BLD AUTO: 9.9 %
LYMPHOCYTES NFR FLD: 20 %
LYMPHOCYTES NFR FLD: 41 %
M PROTEIN MFR SERPL ELPH: 5.2 G/DL (ref 6.4–8.2)
M PROTEIN MFR SERPL ELPH: 5.4 G/DL (ref 6.4–8.2)
M PROTEIN MFR SERPL ELPH: 5.5 G/DL (ref 6.4–8.2)
M PROTEIN MFR SERPL ELPH: 5.7 G/DL (ref 6.4–8.2)
M PROTEIN MFR SERPL ELPH: 5.7 G/DL (ref 6.4–8.2)
M PROTEIN MFR SERPL ELPH: 6.1 G/DL (ref 6.4–8.2)
M PROTEIN MFR SERPL ELPH: 6.2 G/DL (ref 6.4–8.2)
M PROTEIN MFR SERPL ELPH: 6.7 G/DL (ref 6.4–8.2)
M PROTEIN MFR SERPL ELPH: 6.7 G/DL (ref 6.4–8.2)
MCH RBC QN AUTO: 29.6 PG (ref 26–34)
MCH RBC QN AUTO: 29.6 PG (ref 26–34)
MCH RBC QN AUTO: 30.1 PG (ref 26–34)
MCH RBC QN AUTO: 30.2 PG (ref 26–34)
MCH RBC QN AUTO: 30.4 PG (ref 26–34)
MCH RBC QN AUTO: 30.6 PG (ref 26–34)
MCH RBC QN AUTO: 30.7 PG (ref 26–34)
MCH RBC QN AUTO: 30.8 PG (ref 26–34)
MCH RBC QN AUTO: 30.8 PG (ref 26–34)
MCH RBC QN AUTO: 30.9 PG (ref 26–34)
MCH RBC QN AUTO: 31 PG (ref 26–34)
MCH RBC QN AUTO: 31.1 PG (ref 26–34)
MCH RBC QN AUTO: 31.3 PG (ref 26–34)
MCH RBC QN AUTO: 31.3 PG (ref 26–34)
MCH RBC QN AUTO: 31.4 PG (ref 26–34)
MCHC RBC AUTO-ENTMCNC: 31.3 G/DL (ref 31–37)
MCHC RBC AUTO-ENTMCNC: 31.6 G/DL (ref 31–37)
MCHC RBC AUTO-ENTMCNC: 31.8 G/DL (ref 31–37)
MCHC RBC AUTO-ENTMCNC: 31.9 G/DL (ref 31–37)
MCHC RBC AUTO-ENTMCNC: 32 G/DL (ref 31–37)
MCHC RBC AUTO-ENTMCNC: 32.5 G/DL (ref 31–37)
MCHC RBC AUTO-ENTMCNC: 32.6 G/DL (ref 31–37)
MCHC RBC AUTO-ENTMCNC: 32.8 G/DL (ref 31–37)
MCHC RBC AUTO-ENTMCNC: 32.9 G/DL (ref 31–37)
MCHC RBC AUTO-ENTMCNC: 32.9 G/DL (ref 31–37)
MCHC RBC AUTO-ENTMCNC: 33 G/DL (ref 31–37)
MCHC RBC AUTO-ENTMCNC: 33 G/DL (ref 31–37)
MCHC RBC AUTO-ENTMCNC: 33.3 G/DL (ref 31–37)
MCHC RBC AUTO-ENTMCNC: 33.5 G/DL (ref 31–37)
MCHC RBC AUTO-ENTMCNC: 33.7 G/DL (ref 31–37)
MCHC RBC AUTO-ENTMCNC: 33.8 G/DL (ref 31–37)
MCV RBC AUTO: 91.3 FL (ref 80–100)
MCV RBC AUTO: 91.4 FL (ref 80–100)
MCV RBC AUTO: 92 FL (ref 80–100)
MCV RBC AUTO: 92.6 FL (ref 80–100)
MCV RBC AUTO: 92.8 FL (ref 80–100)
MCV RBC AUTO: 92.8 FL (ref 80–100)
MCV RBC AUTO: 93 FL (ref 80–100)
MCV RBC AUTO: 93.1 FL (ref 80–100)
MCV RBC AUTO: 93.2 FL (ref 80–100)
MCV RBC AUTO: 93.2 FL (ref 80–100)
MCV RBC AUTO: 93.3 FL (ref 80–100)
MCV RBC AUTO: 93.4 FL (ref 80–100)
MCV RBC AUTO: 93.7 FL (ref 80–100)
MCV RBC AUTO: 93.9 FL (ref 80–100)
MCV RBC AUTO: 94 FL (ref 80–100)
MCV RBC AUTO: 94 FL (ref 80–100)
MCV RBC AUTO: 94.1 FL (ref 80–100)
MCV RBC AUTO: 94.2 FL (ref 80–100)
MCV RBC AUTO: 94.5 FL (ref 80–100)
MCV RBC AUTO: 95 FL (ref 80–100)
MCV RBC AUTO: 95.5 FL (ref 80–100)
MONOCYTES # BLD AUTO: 0.05 X10(3) UL (ref 0.1–1)
MONOCYTES # BLD AUTO: 0.06 X10(3) UL (ref 0.1–1)
MONOCYTES # BLD AUTO: 0.07 X10(3) UL (ref 0.1–1)
MONOCYTES # BLD AUTO: 0.09 X10(3) UL (ref 0.1–1)
MONOCYTES # BLD AUTO: 0.1 X10(3) UL (ref 0.1–1)
MONOCYTES # BLD AUTO: 0.1 X10(3) UL (ref 0.1–1)
MONOCYTES # BLD AUTO: 0.12 X10(3) UL (ref 0.1–1)
MONOCYTES # BLD AUTO: 0.12 X10(3) UL (ref 0.1–1)
MONOCYTES # BLD AUTO: 0.34 X10(3) UL (ref 0.1–1)
MONOCYTES # BLD AUTO: 0.43 X10(3) UL (ref 0.1–1)
MONOCYTES # BLD AUTO: 0.46 X10(3) UL (ref 0.1–1)
MONOCYTES # BLD AUTO: 0.47 X10(3) UL (ref 0.1–1)
MONOCYTES # BLD AUTO: 0.56 X10(3) UL (ref 0.1–1)
MONOCYTES # BLD AUTO: 0.59 X10(3) UL (ref 0.1–1)
MONOCYTES # BLD AUTO: 0.64 X10(3) UL (ref 0.1–1)
MONOCYTES # BLD AUTO: 0.68 X10(3) UL (ref 0.1–1)
MONOCYTES # BLD AUTO: 0.69 X10(3) UL (ref 0.1–1)
MONOCYTES # BLD AUTO: 0.72 X10(3) UL (ref 0.1–1)
MONOCYTES # BLD AUTO: 0.75 X10(3) UL (ref 0.1–1)
MONOCYTES # BLD AUTO: 0.77 X10(3) UL (ref 0.1–1)
MONOCYTES NFR BLD AUTO: 0.4 %
MONOCYTES NFR BLD AUTO: 0.6 %
MONOCYTES NFR BLD AUTO: 0.7 %
MONOCYTES NFR BLD AUTO: 0.9 %
MONOCYTES NFR BLD AUTO: 1.7 %
MONOCYTES NFR BLD AUTO: 11.5 %
MONOCYTES NFR BLD AUTO: 12.8 %
MONOCYTES NFR BLD AUTO: 13.6 %
MONOCYTES NFR BLD AUTO: 13.8 %
MONOCYTES NFR BLD AUTO: 13.9 %
MONOCYTES NFR BLD AUTO: 13.9 %
MONOCYTES NFR BLD AUTO: 14.3 %
MONOCYTES NFR BLD AUTO: 15.6 %
MONOCYTES NFR BLD AUTO: 15.8 %
MONOCYTES NFR BLD AUTO: 18.9 %
MONOCYTES NFR BLD AUTO: 2.2 %
MONOCYTES NFR BLD AUTO: 2.7 %
MONOCYTES NFR BLD AUTO: 20.4 %
MONOCYTES NFR BLD AUTO: 3 %
MONOCYTES NFR BLD AUTO: 3.7 %
MONOCYTES NFR BLD AUTO: 3.9 %
MONOCYTES NFR BLD AUTO: 4 %
MONOCYTES NFR FLD: 55 %
MONOCYTES NFR FLD: 77 %
NEUTROPHILS # BLD AUTO: 1.97 X10 (3) UL (ref 1.5–7.7)
NEUTROPHILS # BLD AUTO: 1.97 X10(3) UL (ref 1.5–7.7)
NEUTROPHILS # BLD AUTO: 11.97 X10 (3) UL (ref 1.5–7.7)
NEUTROPHILS # BLD AUTO: 11.97 X10(3) UL (ref 1.5–7.7)
NEUTROPHILS # BLD AUTO: 12.74 X10 (3) UL (ref 1.5–7.7)
NEUTROPHILS # BLD AUTO: 12.74 X10(3) UL (ref 1.5–7.7)
NEUTROPHILS # BLD AUTO: 12.83 X10 (3) UL (ref 1.5–7.7)
NEUTROPHILS # BLD AUTO: 12.83 X10(3) UL (ref 1.5–7.7)
NEUTROPHILS # BLD AUTO: 2.03 X10 (3) UL (ref 1.5–7.7)
NEUTROPHILS # BLD AUTO: 2.03 X10(3) UL (ref 1.5–7.7)
NEUTROPHILS # BLD AUTO: 2.08 X10 (3) UL (ref 1.5–7.7)
NEUTROPHILS # BLD AUTO: 2.08 X10(3) UL (ref 1.5–7.7)
NEUTROPHILS # BLD AUTO: 2.27 X10 (3) UL (ref 1.5–7.7)
NEUTROPHILS # BLD AUTO: 2.27 X10(3) UL (ref 1.5–7.7)
NEUTROPHILS # BLD AUTO: 2.29 X10 (3) UL (ref 1.5–7.7)
NEUTROPHILS # BLD AUTO: 2.29 X10(3) UL (ref 1.5–7.7)
NEUTROPHILS # BLD AUTO: 2.31 X10 (3) UL (ref 1.5–7.7)
NEUTROPHILS # BLD AUTO: 2.31 X10 (3) UL (ref 1.5–7.7)
NEUTROPHILS # BLD AUTO: 2.31 X10(3) UL (ref 1.5–7.7)
NEUTROPHILS # BLD AUTO: 2.31 X10(3) UL (ref 1.5–7.7)
NEUTROPHILS # BLD AUTO: 2.41 X10 (3) UL (ref 1.5–7.7)
NEUTROPHILS # BLD AUTO: 2.41 X10(3) UL (ref 1.5–7.7)
NEUTROPHILS # BLD AUTO: 2.57 X10 (3) UL (ref 1.5–7.7)
NEUTROPHILS # BLD AUTO: 2.57 X10(3) UL (ref 1.5–7.7)
NEUTROPHILS # BLD AUTO: 2.63 X10 (3) UL (ref 1.5–7.7)
NEUTROPHILS # BLD AUTO: 2.63 X10(3) UL (ref 1.5–7.7)
NEUTROPHILS # BLD AUTO: 2.81 X10 (3) UL (ref 1.5–7.7)
NEUTROPHILS # BLD AUTO: 2.81 X10(3) UL (ref 1.5–7.7)
NEUTROPHILS # BLD AUTO: 2.89 X10 (3) UL (ref 1.5–7.7)
NEUTROPHILS # BLD AUTO: 2.89 X10(3) UL (ref 1.5–7.7)
NEUTROPHILS # BLD AUTO: 3.16 X10 (3) UL (ref 1.5–7.7)
NEUTROPHILS # BLD AUTO: 3.16 X10(3) UL (ref 1.5–7.7)
NEUTROPHILS # BLD AUTO: 3.24 X10 (3) UL (ref 1.5–7.7)
NEUTROPHILS # BLD AUTO: 3.24 X10(3) UL (ref 1.5–7.7)
NEUTROPHILS # BLD AUTO: 3.25 X10 (3) UL (ref 1.5–7.7)
NEUTROPHILS # BLD AUTO: 3.25 X10(3) UL (ref 1.5–7.7)
NEUTROPHILS # BLD AUTO: 3.49 X10 (3) UL (ref 1.5–7.7)
NEUTROPHILS # BLD AUTO: 3.49 X10(3) UL (ref 1.5–7.7)
NEUTROPHILS # BLD AUTO: 4.14 X10 (3) UL (ref 1.5–7.7)
NEUTROPHILS # BLD AUTO: 4.14 X10(3) UL (ref 1.5–7.7)
NEUTROPHILS # BLD AUTO: 7.47 X10 (3) UL (ref 1.5–7.7)
NEUTROPHILS # BLD AUTO: 7.47 X10(3) UL (ref 1.5–7.7)
NEUTROPHILS # BLD AUTO: 8 X10 (3) UL (ref 1.5–7.7)
NEUTROPHILS # BLD AUTO: 8 X10(3) UL (ref 1.5–7.7)
NEUTROPHILS NFR BLD AUTO: 62.2 %
NEUTROPHILS NFR BLD AUTO: 67.9 %
NEUTROPHILS NFR BLD AUTO: 68.1 %
NEUTROPHILS NFR BLD AUTO: 68.1 %
NEUTROPHILS NFR BLD AUTO: 68.2 %
NEUTROPHILS NFR BLD AUTO: 68.5 %
NEUTROPHILS NFR BLD AUTO: 68.6 %
NEUTROPHILS NFR BLD AUTO: 68.7 %
NEUTROPHILS NFR BLD AUTO: 68.9 %
NEUTROPHILS NFR BLD AUTO: 69.1 %
NEUTROPHILS NFR BLD AUTO: 72 %
NEUTROPHILS NFR BLD AUTO: 83.7 %
NEUTROPHILS NFR BLD AUTO: 84.3 %
NEUTROPHILS NFR BLD AUTO: 84.9 %
NEUTROPHILS NFR BLD AUTO: 85.1 %
NEUTROPHILS NFR BLD AUTO: 85.8 %
NEUTROPHILS NFR BLD AUTO: 86.9 %
NEUTROPHILS NFR BLD AUTO: 91.2 %
NEUTROPHILS NFR BLD AUTO: 94.9 %
NEUTROPHILS NFR BLD AUTO: 95.3 %
NEUTROPHILS NFR BLD AUTO: 95.7 %
NEUTROPHILS NFR BLD AUTO: 96.7 %
NEUTROPHILS NFR FLD: 2 %
NEUTROPHILS NFR FLD: 3 %
NITRITE UR QL STRIP.AUTO: NEGATIVE
OSMOLALITY SERPL CALC.SUM OF ELEC: 283 MOSM/KG (ref 275–295)
OSMOLALITY SERPL CALC.SUM OF ELEC: 284 MOSM/KG (ref 275–295)
OSMOLALITY SERPL CALC.SUM OF ELEC: 285 MOSM/KG (ref 275–295)
OSMOLALITY SERPL CALC.SUM OF ELEC: 287 MOSM/KG (ref 275–295)
OSMOLALITY SERPL CALC.SUM OF ELEC: 287 MOSM/KG (ref 275–295)
OSMOLALITY SERPL CALC.SUM OF ELEC: 288 MOSM/KG (ref 275–295)
OSMOLALITY SERPL CALC.SUM OF ELEC: 290 MOSM/KG (ref 275–295)
OSMOLALITY SERPL CALC.SUM OF ELEC: 291 MOSM/KG (ref 275–295)
OSMOLALITY SERPL CALC.SUM OF ELEC: 291 MOSM/KG (ref 275–295)
OSMOLALITY SERPL CALC.SUM OF ELEC: 292 MOSM/KG (ref 275–295)
OSMOLALITY SERPL CALC.SUM OF ELEC: 293 MOSM/KG (ref 275–295)
OSMOLALITY SERPL CALC.SUM OF ELEC: 293 MOSM/KG (ref 275–295)
OSMOLALITY SERPL CALC.SUM OF ELEC: 294 MOSM/KG (ref 275–295)
OSMOLALITY SERPL CALC.SUM OF ELEC: 295 MOSM/KG (ref 275–295)
OSMOLALITY SERPL CALC.SUM OF ELEC: 295 MOSM/KG (ref 275–295)
OSMOLALITY SERPL CALC.SUM OF ELEC: 296 MOSM/KG (ref 275–295)
OSMOLALITY SERPL CALC.SUM OF ELEC: 297 MOSM/KG (ref 275–295)
OSMOLALITY SERPL CALC.SUM OF ELEC: 298 MOSM/KG (ref 275–295)
OSMOLALITY SERPL CALC.SUM OF ELEC: 298 MOSM/KG (ref 275–295)
OSMOLALITY SERPL CALC.SUM OF ELEC: 299 MOSM/KG (ref 275–295)
OSMOLALITY SERPL CALC.SUM OF ELEC: 300 MOSM/KG (ref 275–295)
OSMOLALITY SERPL CALC.SUM OF ELEC: 301 MOSM/KG (ref 275–295)
OSMOLALITY SERPL CALC.SUM OF ELEC: 302 MOSM/KG (ref 275–295)
PH UR: 5 [PH] (ref 5–8)
PHOSPHATE SERPL-MCNC: 2.5 MG/DL (ref 2.5–4.9)
PHOSPHATE SERPL-MCNC: 3.5 MG/DL (ref 2.5–4.9)
PHOSPHATE SERPL-MCNC: 3.5 MG/DL (ref 2.5–4.9)
PHOSPHATE SERPL-MCNC: 3.7 MG/DL (ref 2.5–4.9)
PHOSPHATE SERPL-MCNC: 3.8 MG/DL (ref 2.5–4.9)
PHOSPHATE SERPL-MCNC: 4.1 MG/DL (ref 2.5–4.9)
PHOSPHATE SERPL-MCNC: 4.3 MG/DL (ref 2.5–4.9)
PHOSPHATE SERPL-MCNC: 4.3 MG/DL (ref 2.5–4.9)
PHOSPHATE SERPL-MCNC: 4.8 MG/DL (ref 2.5–4.9)
PHOSPHATE SERPL-MCNC: 4.9 MG/DL (ref 2.5–4.9)
PHOSPHATE SERPL-MCNC: 4.9 MG/DL (ref 2.5–4.9)
PHOSPHATE SERPL-MCNC: 5 MG/DL (ref 2.5–4.9)
PLATELET # BLD AUTO: 108 10(3)UL (ref 150–450)
PLATELET # BLD AUTO: 114 10(3)UL (ref 150–450)
PLATELET # BLD AUTO: 115 10(3)UL (ref 150–450)
PLATELET # BLD AUTO: 126 10(3)UL (ref 150–450)
PLATELET # BLD AUTO: 127 10(3)UL (ref 150–450)
PLATELET # BLD AUTO: 130 10(3)UL (ref 150–450)
PLATELET # BLD AUTO: 131 10(3)UL (ref 150–450)
PLATELET # BLD AUTO: 14 10(3)UL (ref 150–450)
PLATELET # BLD AUTO: 144 10(3)UL (ref 150–450)
PLATELET # BLD AUTO: 146 10(3)UL (ref 150–450)
PLATELET # BLD AUTO: 154 10(3)UL (ref 150–450)
PLATELET # BLD AUTO: 157 10(3)UL (ref 150–450)
PLATELET # BLD AUTO: 161 10(3)UL (ref 150–450)
PLATELET # BLD AUTO: 163 10(3)UL (ref 150–450)
PLATELET # BLD AUTO: 164 10(3)UL (ref 150–450)
PLATELET # BLD AUTO: 169 10(3)UL (ref 150–450)
PLATELET # BLD AUTO: 187 10(3)UL (ref 150–450)
PLATELET # BLD AUTO: 21 10(3)UL (ref 150–450)
PLATELET # BLD AUTO: 232 10(3)UL (ref 150–450)
PLATELET # BLD AUTO: 27 10(3)UL (ref 150–450)
PLATELET # BLD AUTO: 28 10(3)UL (ref 150–450)
PLATELET # BLD AUTO: 288 10(3)UL (ref 150–450)
PLATELET # BLD AUTO: 70 10(3)UL (ref 150–450)
PLATELET # BLD AUTO: 90 10(3)UL (ref 150–450)
PLATELET # BLD AUTO: 91 10(3)UL (ref 150–450)
PLATELET MORPHOLOGY: NORMAL
POTASSIUM SERPL-SCNC: 3.6 MMOL/L (ref 3.5–5.1)
POTASSIUM SERPL-SCNC: 3.6 MMOL/L (ref 3.5–5.1)
POTASSIUM SERPL-SCNC: 3.7 MMOL/L (ref 3.5–5.1)
POTASSIUM SERPL-SCNC: 3.8 MMOL/L (ref 3.5–5.1)
POTASSIUM SERPL-SCNC: 3.9 MMOL/L (ref 3.5–5.1)
POTASSIUM SERPL-SCNC: 4 MMOL/L (ref 3.5–5.1)
POTASSIUM SERPL-SCNC: 4.1 MMOL/L (ref 3.5–5.1)
POTASSIUM SERPL-SCNC: 4.2 MMOL/L (ref 3.5–5.1)
POTASSIUM SERPL-SCNC: 4.5 MMOL/L (ref 3.5–5.1)
POTASSIUM SERPL-SCNC: 4.6 MMOL/L (ref 3.5–5.1)
POTASSIUM SERPL-SCNC: 4.7 MMOL/L (ref 3.5–5.1)
POTASSIUM SERPL-SCNC: 5 MMOL/L (ref 3.5–5.1)
POTASSIUM UR-SCNC: 33 MMOL/L
PROCALCITONIN SERPL-MCNC: 1.05 NG/ML (ref ?–0.16)
PROCALCITONIN SERPL-MCNC: 5.07 NG/ML (ref ?–0.16)
PROT UR-MCNC: 30 MG/DL
PROT UR-MCNC: 30 MG/DL
PROT UR-MCNC: NEGATIVE MG/DL
PROTHROMBIN TIME: 15.9 SECONDS (ref 11.8–14.5)
PROTHROMBIN TIME: 16.2 SECONDS (ref 11.8–14.5)
PROTHROMBIN TIME: 16.6 SECONDS (ref 11.8–14.5)
PROTHROMBIN TIME: 17.1 SECONDS (ref 11.8–14.5)
PROTHROMBIN TIME: 17.2 SECONDS (ref 11.8–14.5)
PROTHROMBIN TIME: 18.4 SECONDS (ref 11.8–14.5)
PROTHROMBIN TIME: 20.7 SECONDS (ref 11.8–14.5)
PROTHROMBIN TIME: 20.7 SECONDS (ref 11.8–14.5)
PROTHROMBIN TIME: 21.7 SECONDS (ref 11.8–14.5)
PROTHROMBIN TIME: 22.4 SECONDS (ref 11.8–14.5)
PROTHROMBIN TIME: 22.8 SECONDS (ref 11.8–14.5)
PROTHROMBIN TIME: 23.6 SECONDS (ref 11.8–14.5)
PROTHROMBIN TIME: 25.3 SECONDS (ref 11.8–14.5)
PROTHROMBIN TIME: 25.6 SECONDS (ref 11.8–14.5)
PROTHROMBIN TIME: 31.2 SECONDS (ref 11.8–14.5)
PROTHROMBIN TIME: 31.7 SECONDS (ref 11.8–14.5)
PROTHROMBIN TIME: 32 SECONDS (ref 11.8–14.5)
PROTHROMBIN TIME: 32.7 SECONDS (ref 11.8–14.5)
PROTHROMBIN TIME: 36.8 SECONDS (ref 11.8–14.5)
RBC # BLD AUTO: 2.19 X10(6)UL (ref 4.3–5.7)
RBC # BLD AUTO: 2.2 X10(6)UL (ref 4.3–5.7)
RBC # BLD AUTO: 2.29 X10(6)UL (ref 4.3–5.7)
RBC # BLD AUTO: 2.3 X10(6)UL (ref 4.3–5.7)
RBC # BLD AUTO: 2.3 X10(6)UL (ref 4.3–5.7)
RBC # BLD AUTO: 2.35 X10(6)UL (ref 4.3–5.7)
RBC # BLD AUTO: 2.37 X10(6)UL (ref 4.3–5.7)
RBC # BLD AUTO: 2.38 X10(6)UL (ref 4.3–5.7)
RBC # BLD AUTO: 2.4 X10(6)UL (ref 4.3–5.7)
RBC # BLD AUTO: 2.42 X10(6)UL (ref 4.3–5.7)
RBC # BLD AUTO: 2.42 X10(6)UL (ref 4.3–5.7)
RBC # BLD AUTO: 2.43 X10(6)UL (ref 4.3–5.7)
RBC # BLD AUTO: 2.45 X10(6)UL (ref 4.3–5.7)
RBC # BLD AUTO: 2.48 X10(6)UL (ref 4.3–5.7)
RBC # BLD AUTO: 2.52 X10(6)UL (ref 4.3–5.7)
RBC # BLD AUTO: 2.53 X10(6)UL (ref 4.3–5.7)
RBC # BLD AUTO: 2.53 X10(6)UL (ref 4.3–5.7)
RBC # BLD AUTO: 2.57 X10(6)UL (ref 4.3–5.7)
RBC # BLD AUTO: 2.65 X10(6)UL (ref 4.3–5.7)
RBC # BLD AUTO: 2.65 X10(6)UL (ref 4.3–5.7)
RBC # BLD AUTO: 2.73 X10(6)UL (ref 4.3–5.7)
RBC # BLD AUTO: 2.74 X10(6)UL (ref 4.3–5.7)
RBC # BLD AUTO: 2.78 X10(6)UL (ref 4.3–5.7)
RBC # BLD AUTO: 2.98 X10(6)UL (ref 4.3–5.7)
RBC # BLD AUTO: 3.09 X10(6)UL (ref 4.3–5.7)
RBC # FLD: 1455 /CUMM (ref ?–1)
RBC # FLD: 226 /CUMM (ref ?–1)
RBC #/AREA URNS AUTO: 2 /HPF
RBC #/AREA URNS AUTO: 3 /HPF
RBC #/AREA URNS AUTO: 3 /HPF
RH BLOOD TYPE: NEGATIVE
SARS-COV-2 RNA RESP QL NAA+PROBE: NOT DETECTED
SODIUM SERPL-SCNC: 13 MMOL/L
SODIUM SERPL-SCNC: 132 MMOL/L (ref 136–145)
SODIUM SERPL-SCNC: 133 MMOL/L (ref 136–145)
SODIUM SERPL-SCNC: 134 MMOL/L (ref 136–145)
SODIUM SERPL-SCNC: 135 MMOL/L (ref 136–145)
SODIUM SERPL-SCNC: 136 MMOL/L (ref 136–145)
SODIUM SERPL-SCNC: 137 MMOL/L (ref 136–145)
SODIUM SERPL-SCNC: 138 MMOL/L (ref 136–145)
SODIUM SERPL-SCNC: 139 MMOL/L (ref 136–145)
SODIUM SERPL-SCNC: 139 MMOL/L (ref 136–145)
SODIUM SERPL-SCNC: 18 MMOL/L
SODIUM SERPL-SCNC: 19 MMOL/L
SODIUM SERPL-SCNC: <5 MMOL/L
SP GR UR STRIP: 1.01 (ref 1–1.03)
SP GR UR STRIP: 1.02 (ref 1–1.03)
SP GR UR STRIP: 1.02 (ref 1–1.03)
TURBIDITY CSF QL: CLEAR
TURBIDITY CSF QL: CLEAR
UROBILINOGEN UR STRIP-ACNC: <2
UUN UR-MCNC: 416 MG/DL
UUN UR-MCNC: 549 MG/DL
VANCOMYCIN SERPL-MCNC: 15.6 UG/ML
VANCOMYCIN TROUGH SERPL-MCNC: 13 UG/ML (ref 10–20)
VANCOMYCIN TROUGH SERPL-MCNC: 24.5 UG/ML (ref 10–20)
VANCOMYCIN TROUGH SERPL-MCNC: 41.4 UG/ML (ref 10–20)
VANCOMYCIN TROUGH SERPL-MCNC: 45.2 UG/ML (ref 10–20)
WBC # BLD AUTO: 11.7 X10(3) UL (ref 4–11)
WBC # BLD AUTO: 12.4 X10(3) UL (ref 4–11)
WBC # BLD AUTO: 13.4 X10(3) UL (ref 4–11)
WBC # BLD AUTO: 13.4 X10(3) UL (ref 4–11)
WBC # BLD AUTO: 2.3 X10(3) UL (ref 4–11)
WBC # BLD AUTO: 2.4 X10(3) UL (ref 4–11)
WBC # BLD AUTO: 2.7 X10(3) UL (ref 4–11)
WBC # BLD AUTO: 2.9 X10(3) UL (ref 4–11)
WBC # BLD AUTO: 3 X10(3) UL (ref 4–11)
WBC # BLD AUTO: 3 X10(3) UL (ref 4–11)
WBC # BLD AUTO: 3.2 X10(3) UL (ref 4–11)
WBC # BLD AUTO: 3.3 X10(3) UL (ref 4–11)
WBC # BLD AUTO: 3.4 X10(3) UL (ref 4–11)
WBC # BLD AUTO: 3.7 X10(3) UL (ref 4–11)
WBC # BLD AUTO: 3.8 X10(3) UL (ref 4–11)
WBC # BLD AUTO: 4.1 X10(3) UL (ref 4–11)
WBC # BLD AUTO: 4.3 X10(3) UL (ref 4–11)
WBC # BLD AUTO: 4.6 X10(3) UL (ref 4–11)
WBC # BLD AUTO: 4.8 X10(3) UL (ref 4–11)
WBC # BLD AUTO: 4.9 X10(3) UL (ref 4–11)
WBC # BLD AUTO: 6 X10(3) UL (ref 4–11)
WBC # BLD AUTO: 7.9 X10(3) UL (ref 4–11)
WBC # BLD AUTO: 8.8 X10(3) UL (ref 4–11)
WBC # FLD: 65 /CUMM (ref ?–1)
WBC # FLD: 679 /CUMM (ref ?–1)
WBC #/AREA URNS AUTO: 10 /HPF
WBC #/AREA URNS AUTO: 54 /HPF
WBC #/AREA URNS AUTO: 8 /HPF
WBC OTHER NFR FLD: 1 %
WBC OTHER NFR FLD: 1 %

## 2020-01-01 PROCEDURE — 96374 THER/PROPH/DIAG INJ IV PUSH: CPT

## 2020-01-01 PROCEDURE — 80202 ASSAY OF VANCOMYCIN: CPT | Performed by: INTERNAL MEDICINE

## 2020-01-01 PROCEDURE — 76700 US EXAM ABDOM COMPLETE: CPT | Performed by: HOSPITALIST

## 2020-01-01 PROCEDURE — 85025 COMPLETE CBC W/AUTO DIFF WBC: CPT | Performed by: INTERNAL MEDICINE

## 2020-01-01 PROCEDURE — 0W9G30Z DRAINAGE OF PERITONEAL CAVITY WITH DRAINAGE DEVICE, PERCUTANEOUS APPROACH: ICD-10-PCS | Performed by: RADIOLOGY

## 2020-01-01 PROCEDURE — 71045 X-RAY EXAM CHEST 1 VIEW: CPT | Performed by: HOSPITALIST

## 2020-01-01 PROCEDURE — 82962 GLUCOSE BLOOD TEST: CPT

## 2020-01-01 PROCEDURE — 0W9G3ZZ DRAINAGE OF PERITONEAL CAVITY, PERCUTANEOUS APPROACH: ICD-10-PCS | Performed by: CLINICAL NURSE SPECIALIST

## 2020-01-01 PROCEDURE — 30233N1 TRANSFUSION OF NONAUTOLOGOUS RED BLOOD CELLS INTO PERIPHERAL VEIN, PERCUTANEOUS APPROACH: ICD-10-PCS | Performed by: INTERNAL MEDICINE

## 2020-01-01 PROCEDURE — 99231 SBSQ HOSP IP/OBS SF/LOW 25: CPT | Performed by: REGISTERED NURSE

## 2020-01-01 PROCEDURE — 80053 COMPREHEN METABOLIC PANEL: CPT | Performed by: INTERNAL MEDICINE

## 2020-01-01 PROCEDURE — 80202 ASSAY OF VANCOMYCIN: CPT | Performed by: HOSPITALIST

## 2020-01-01 PROCEDURE — 97162 PT EVAL MOD COMPLEX 30 MIN: CPT

## 2020-01-01 PROCEDURE — 49083 ABD PARACENTESIS W/IMAGING: CPT | Performed by: HOSPITALIST

## 2020-01-01 PROCEDURE — 80053 COMPREHEN METABOLIC PANEL: CPT | Performed by: HOSPITALIST

## 2020-01-01 PROCEDURE — 82565 ASSAY OF CREATININE: CPT | Performed by: INTERNAL MEDICINE

## 2020-01-01 PROCEDURE — 80069 RENAL FUNCTION PANEL: CPT | Performed by: INTERNAL MEDICINE

## 2020-01-01 PROCEDURE — 85610 PROTHROMBIN TIME: CPT | Performed by: INTERNAL MEDICINE

## 2020-01-01 PROCEDURE — 97530 THERAPEUTIC ACTIVITIES: CPT

## 2020-01-01 PROCEDURE — 83605 ASSAY OF LACTIC ACID: CPT | Performed by: HOSPITALIST

## 2020-01-01 PROCEDURE — 85025 COMPLETE CBC W/AUTO DIFF WBC: CPT | Performed by: HOSPITALIST

## 2020-01-01 PROCEDURE — 30233R1 TRANSFUSION OF NONAUTOLOGOUS PLATELETS INTO PERIPHERAL VEIN, PERCUTANEOUS APPROACH: ICD-10-PCS | Performed by: INTERNAL MEDICINE

## 2020-01-01 PROCEDURE — 86901 BLOOD TYPING SEROLOGIC RH(D): CPT | Performed by: HOSPITALIST

## 2020-01-01 PROCEDURE — 86140 C-REACTIVE PROTEIN: CPT | Performed by: INTERNAL MEDICINE

## 2020-01-01 PROCEDURE — 93923 UPR/LXTR ART STDY 3+ LVLS: CPT | Performed by: INTERNAL MEDICINE

## 2020-01-01 PROCEDURE — 85610 PROTHROMBIN TIME: CPT | Performed by: HOSPITALIST

## 2020-01-01 PROCEDURE — 96361 HYDRATE IV INFUSION ADD-ON: CPT

## 2020-01-01 PROCEDURE — 86920 COMPATIBILITY TEST SPIN: CPT

## 2020-01-01 PROCEDURE — 5A09357 ASSISTANCE WITH RESPIRATORY VENTILATION, LESS THAN 24 CONSECUTIVE HOURS, CONTINUOUS POSITIVE AIRWAY PRESSURE: ICD-10-PCS | Performed by: HOSPITALIST

## 2020-01-01 PROCEDURE — 85025 COMPLETE CBC W/AUTO DIFF WBC: CPT | Performed by: EMERGENCY MEDICINE

## 2020-01-01 PROCEDURE — 87086 URINE CULTURE/COLONY COUNT: CPT | Performed by: INTERNAL MEDICINE

## 2020-01-01 PROCEDURE — 80053 COMPREHEN METABOLIC PANEL: CPT | Performed by: EMERGENCY MEDICINE

## 2020-01-01 PROCEDURE — 86900 BLOOD TYPING SEROLOGIC ABO: CPT | Performed by: INTERNAL MEDICINE

## 2020-01-01 PROCEDURE — 83735 ASSAY OF MAGNESIUM: CPT | Performed by: INTERNAL MEDICINE

## 2020-01-01 PROCEDURE — 94660 CPAP INITIATION&MGMT: CPT

## 2020-01-01 PROCEDURE — 49418 INSERT TUN IP CATH PERC: CPT

## 2020-01-01 PROCEDURE — 85018 HEMOGLOBIN: CPT | Performed by: INTERNAL MEDICINE

## 2020-01-01 PROCEDURE — 71046 X-RAY EXAM CHEST 2 VIEWS: CPT | Performed by: INTERNAL MEDICINE

## 2020-01-01 PROCEDURE — 74176 CT ABD & PELVIS W/O CONTRAST: CPT | Performed by: HOSPITALIST

## 2020-01-01 PROCEDURE — 49406 IMAGE CATH FLUID PERI/RETRO: CPT

## 2020-01-01 PROCEDURE — 86850 RBC ANTIBODY SCREEN: CPT | Performed by: INTERNAL MEDICINE

## 2020-01-01 PROCEDURE — 97116 GAIT TRAINING THERAPY: CPT

## 2020-01-01 PROCEDURE — 87493 C DIFF AMPLIFIED PROBE: CPT | Performed by: INTERNAL MEDICINE

## 2020-01-01 PROCEDURE — 97165 OT EVAL LOW COMPLEX 30 MIN: CPT

## 2020-01-01 PROCEDURE — 86900 BLOOD TYPING SEROLOGIC ABO: CPT | Performed by: HOSPITALIST

## 2020-01-01 PROCEDURE — 93971 EXTREMITY STUDY: CPT | Performed by: INTERNAL MEDICINE

## 2020-01-01 PROCEDURE — 80048 BASIC METABOLIC PNL TOTAL CA: CPT | Performed by: HOSPITALIST

## 2020-01-01 PROCEDURE — 74019 RADEX ABDOMEN 2 VIEWS: CPT | Performed by: INTERNAL MEDICINE

## 2020-01-01 PROCEDURE — 99285 EMERGENCY DEPT VISIT HI MDM: CPT

## 2020-01-01 PROCEDURE — 97535 SELF CARE MNGMENT TRAINING: CPT

## 2020-01-01 PROCEDURE — 85060 BLOOD SMEAR INTERPRETATION: CPT | Performed by: INTERNAL MEDICINE

## 2020-01-01 PROCEDURE — 85025 COMPLETE CBC W/AUTO DIFF WBC: CPT

## 2020-01-01 PROCEDURE — 97110 THERAPEUTIC EXERCISES: CPT

## 2020-01-01 PROCEDURE — 80048 BASIC METABOLIC PNL TOTAL CA: CPT

## 2020-01-01 PROCEDURE — 86901 BLOOD TYPING SEROLOGIC RH(D): CPT | Performed by: INTERNAL MEDICINE

## 2020-01-01 PROCEDURE — 87040 BLOOD CULTURE FOR BACTERIA: CPT | Performed by: HOSPITALIST

## 2020-01-01 PROCEDURE — 84145 PROCALCITONIN (PCT): CPT | Performed by: INTERNAL MEDICINE

## 2020-01-01 PROCEDURE — 30233R1 TRANSFUSION OF NONAUTOLOGOUS PLATELETS INTO PERIPHERAL VEIN, PERCUTANEOUS APPROACH: ICD-10-PCS | Performed by: HOSPITALIST

## 2020-01-01 PROCEDURE — 85610 PROTHROMBIN TIME: CPT | Performed by: EMERGENCY MEDICINE

## 2020-01-01 PROCEDURE — 36430 TRANSFUSION BLD/BLD COMPNT: CPT

## 2020-01-01 PROCEDURE — 83605 ASSAY OF LACTIC ACID: CPT | Performed by: EMERGENCY MEDICINE

## 2020-01-01 PROCEDURE — 80053 COMPREHEN METABOLIC PANEL: CPT

## 2020-01-01 PROCEDURE — 99152 MOD SED SAME PHYS/QHP 5/>YRS: CPT

## 2020-01-01 PROCEDURE — 80076 HEPATIC FUNCTION PANEL: CPT | Performed by: EMERGENCY MEDICINE

## 2020-01-01 PROCEDURE — 36415 COLL VENOUS BLD VENIPUNCTURE: CPT

## 2020-01-01 PROCEDURE — 87493 C DIFF AMPLIFIED PROBE: CPT | Performed by: HOSPITALIST

## 2020-01-01 PROCEDURE — 81001 URINALYSIS AUTO W/SCOPE: CPT | Performed by: INTERNAL MEDICINE

## 2020-01-01 PROCEDURE — 99254 IP/OBS CNSLTJ NEW/EST MOD 60: CPT | Performed by: REGISTERED NURSE

## 2020-01-01 PROCEDURE — 84100 ASSAY OF PHOSPHORUS: CPT | Performed by: INTERNAL MEDICINE

## 2020-01-01 PROCEDURE — 83690 ASSAY OF LIPASE: CPT | Performed by: EMERGENCY MEDICINE

## 2020-01-01 PROCEDURE — P9047 ALBUMIN (HUMAN), 25%, 50ML: HCPCS | Performed by: CLINICAL NURSE SPECIALIST

## 2020-01-01 PROCEDURE — 84300 ASSAY OF URINE SODIUM: CPT | Performed by: INTERNAL MEDICINE

## 2020-01-01 PROCEDURE — 82570 ASSAY OF URINE CREATININE: CPT | Performed by: INTERNAL MEDICINE

## 2020-01-01 PROCEDURE — 85730 THROMBOPLASTIN TIME PARTIAL: CPT | Performed by: INTERNAL MEDICINE

## 2020-01-01 PROCEDURE — 86850 RBC ANTIBODY SCREEN: CPT | Performed by: HOSPITALIST

## 2020-01-01 PROCEDURE — 97166 OT EVAL MOD COMPLEX 45 MIN: CPT

## 2020-01-01 PROCEDURE — 80048 BASIC METABOLIC PNL TOTAL CA: CPT | Performed by: EMERGENCY MEDICINE

## 2020-01-01 PROCEDURE — 83690 ASSAY OF LIPASE: CPT

## 2020-01-01 PROCEDURE — 96040 HC GENETIC COUNSELING EA 30 MIN: CPT

## 2020-01-01 RX ORDER — ALBUMIN (HUMAN) 12.5 G/50ML
25 SOLUTION INTRAVENOUS ONCE
Status: COMPLETED | OUTPATIENT
Start: 2020-01-01 | End: 2020-01-01

## 2020-01-01 RX ORDER — VANCOMYCIN HYDROCHLORIDE 125 MG/1
125 CAPSULE ORAL 4 TIMES DAILY
Status: DISCONTINUED | OUTPATIENT
Start: 2020-01-01 | End: 2020-01-01

## 2020-01-01 RX ORDER — ALBUMIN (HUMAN) 12.5 G/50ML
50 SOLUTION INTRAVENOUS ONCE
Status: COMPLETED | OUTPATIENT
Start: 2020-01-01 | End: 2020-01-01

## 2020-01-01 RX ORDER — POLYETHYLENE GLYCOL 3350 17 G/17G
17 POWDER, FOR SOLUTION ORAL DAILY PRN
Status: DISCONTINUED | OUTPATIENT
Start: 2020-01-01 | End: 2020-01-01

## 2020-01-01 RX ORDER — SODIUM CHLORIDE 9 MG/ML
INJECTION, SOLUTION INTRAVENOUS CONTINUOUS
Status: DISCONTINUED | OUTPATIENT
Start: 2020-01-01 | End: 2020-01-01

## 2020-01-01 RX ORDER — LORAZEPAM 0.5 MG/1
0.5 TABLET ORAL 2 TIMES DAILY PRN
Status: DISCONTINUED | OUTPATIENT
Start: 2020-01-01 | End: 2020-01-01

## 2020-01-01 RX ORDER — BISACODYL 10 MG
10 SUPPOSITORY, RECTAL RECTAL
Status: DISCONTINUED | OUTPATIENT
Start: 2020-01-01 | End: 2020-01-01

## 2020-01-01 RX ORDER — ACETAMINOPHEN 325 MG/1
650 TABLET ORAL EVERY 6 HOURS PRN
Status: DISCONTINUED | OUTPATIENT
Start: 2020-01-01 | End: 2020-01-01

## 2020-01-01 RX ORDER — PANTOPRAZOLE SODIUM 40 MG/1
40 TABLET, DELAYED RELEASE ORAL
Status: DISCONTINUED | OUTPATIENT
Start: 2020-01-01 | End: 2020-01-01

## 2020-01-01 RX ORDER — ALBUMIN (HUMAN) 12.5 G/50ML
25 SOLUTION INTRAVENOUS EVERY 8 HOURS
Status: COMPLETED | OUTPATIENT
Start: 2020-01-01 | End: 2020-01-01

## 2020-01-01 RX ORDER — CHLORHEXIDINE GLUCONATE 0.12 MG/ML
15 RINSE ORAL
Status: DISCONTINUED | OUTPATIENT
Start: 2020-01-01 | End: 2020-01-01

## 2020-01-01 RX ORDER — WARFARIN SODIUM 7.5 MG/1
7.5 TABLET ORAL NIGHTLY
Status: DISCONTINUED | OUTPATIENT
Start: 2020-01-01 | End: 2020-01-01

## 2020-01-01 RX ORDER — SODIUM CHLORIDE 9 MG/ML
1000 INJECTION, SOLUTION INTRAVENOUS ONCE
Status: COMPLETED | OUTPATIENT
Start: 2020-01-01 | End: 2020-01-01

## 2020-01-01 RX ORDER — HEPARIN SODIUM 1000 [USP'U]/ML
80 INJECTION, SOLUTION INTRAVENOUS; SUBCUTANEOUS ONCE
Status: COMPLETED | OUTPATIENT
Start: 2020-01-01 | End: 2020-01-01

## 2020-01-01 RX ORDER — METOCLOPRAMIDE HYDROCHLORIDE 5 MG/ML
10 INJECTION INTRAMUSCULAR; INTRAVENOUS EVERY 8 HOURS PRN
Status: DISCONTINUED | OUTPATIENT
Start: 2020-01-01 | End: 2020-01-01

## 2020-01-01 RX ORDER — SODIUM CHLORIDE 0.9 % (FLUSH) 0.9 %
3 SYRINGE (ML) INJECTION AS NEEDED
Status: DISCONTINUED | OUTPATIENT
Start: 2020-01-01 | End: 2020-01-01

## 2020-01-01 RX ORDER — ONDANSETRON 2 MG/ML
4 INJECTION INTRAMUSCULAR; INTRAVENOUS EVERY 6 HOURS PRN
Status: DISCONTINUED | OUTPATIENT
Start: 2020-01-01 | End: 2020-01-01

## 2020-01-01 RX ORDER — ACETAMINOPHEN 160 MG/5ML
650 SOLUTION ORAL EVERY 6 HOURS PRN
Status: DISCONTINUED | OUTPATIENT
Start: 2020-01-01 | End: 2020-01-01

## 2020-01-01 RX ORDER — ALBUTEROL SULFATE 90 UG/1
2 AEROSOL, METERED RESPIRATORY (INHALATION) EVERY 4 HOURS PRN
Status: DISCONTINUED | OUTPATIENT
Start: 2020-01-01 | End: 2020-01-01

## 2020-01-01 RX ORDER — VANCOMYCIN HYDROCHLORIDE 125 MG/1
125 CAPSULE ORAL 3 TIMES DAILY
Status: DISCONTINUED | OUTPATIENT
Start: 2020-01-01 | End: 2020-01-01

## 2020-01-01 RX ORDER — ENOXAPARIN SODIUM 100 MG/ML
40 INJECTION SUBCUTANEOUS DAILY
Status: DISCONTINUED | OUTPATIENT
Start: 2020-01-01 | End: 2020-01-01

## 2020-01-01 RX ORDER — FUROSEMIDE 10 MG/ML
60 INJECTION INTRAMUSCULAR; INTRAVENOUS ONCE
Status: COMPLETED | OUTPATIENT
Start: 2020-01-01 | End: 2020-01-01

## 2020-01-01 RX ORDER — MAGNESIUM OXIDE 400 MG (241.3 MG MAGNESIUM) TABLET
800 TABLET ONCE
Status: COMPLETED | OUTPATIENT
Start: 2020-01-01 | End: 2020-01-01

## 2020-01-01 RX ORDER — CEFUROXIME AXETIL 500 MG/1
500 TABLET ORAL DAILY
Status: DISCONTINUED | OUTPATIENT
Start: 2020-01-01 | End: 2020-01-01

## 2020-01-01 RX ORDER — VANCOMYCIN HYDROCHLORIDE
1.5 EVERY 12 HOURS
Qty: 7000 ML | Refills: 0 | Status: ON HOLD
Start: 2020-01-01 | End: 2020-01-01

## 2020-01-01 RX ORDER — VANCOMYCIN HYDROCHLORIDE 125 MG/1
125 CAPSULE ORAL DAILY
Status: DISCONTINUED | OUTPATIENT
Start: 2020-01-01 | End: 2020-01-01

## 2020-01-01 RX ORDER — DILTIAZEM HYDROCHLORIDE 120 MG/1
120 CAPSULE, EXTENDED RELEASE ORAL DAILY
Status: DISCONTINUED | OUTPATIENT
Start: 2020-01-01 | End: 2020-01-01

## 2020-01-01 RX ORDER — FUROSEMIDE 10 MG/ML
40 INJECTION INTRAMUSCULAR; INTRAVENOUS
Status: DISCONTINUED | OUTPATIENT
Start: 2020-01-01 | End: 2020-01-01

## 2020-01-01 RX ORDER — VANCOMYCIN HYDROCHLORIDE 125 MG/1
125 CAPSULE ORAL 4 TIMES DAILY
Qty: 44 CAPSULE | Refills: 0 | Status: ON HOLD | OUTPATIENT
Start: 2020-01-01 | End: 2020-01-01

## 2020-01-01 RX ORDER — SODIUM BICARBONATE 650 MG/1
1300 TABLET ORAL 3 TIMES DAILY
Status: DISCONTINUED | OUTPATIENT
Start: 2020-01-01 | End: 2020-01-01

## 2020-01-01 RX ORDER — MAGNESIUM OXIDE 400 MG (241.3 MG MAGNESIUM) TABLET
400 TABLET ONCE
Status: COMPLETED | OUTPATIENT
Start: 2020-01-01 | End: 2020-01-01

## 2020-01-01 RX ORDER — HEPARIN SODIUM AND DEXTROSE 10000; 5 [USP'U]/100ML; G/100ML
INJECTION INTRAVENOUS CONTINUOUS
Status: DISCONTINUED | OUTPATIENT
Start: 2020-01-01 | End: 2020-01-01

## 2020-01-01 RX ORDER — TRAMADOL HYDROCHLORIDE 50 MG/1
50 TABLET ORAL EVERY 12 HOURS PRN
Status: DISCONTINUED | OUTPATIENT
Start: 2020-01-01 | End: 2020-01-01

## 2020-01-01 RX ORDER — OXYCODONE HYDROCHLORIDE 5 MG/1
10 TABLET ORAL EVERY 6 HOURS PRN
Status: DISCONTINUED | OUTPATIENT
Start: 2020-01-01 | End: 2020-01-01

## 2020-01-01 RX ORDER — HYDROMORPHONE HYDROCHLORIDE 1 MG/ML
0.8 INJECTION, SOLUTION INTRAMUSCULAR; INTRAVENOUS; SUBCUTANEOUS EVERY 2 HOUR PRN
Status: DISCONTINUED | OUTPATIENT
Start: 2020-01-01 | End: 2020-01-01

## 2020-01-01 RX ORDER — TRAMADOL HYDROCHLORIDE 50 MG/1
50 TABLET ORAL EVERY 4 HOURS
Status: DISCONTINUED | OUTPATIENT
Start: 2020-01-01 | End: 2020-01-01

## 2020-01-01 RX ORDER — TRAMADOL HYDROCHLORIDE 50 MG/1
50 TABLET ORAL EVERY 6 HOURS PRN
Status: DISCONTINUED | OUTPATIENT
Start: 2020-01-01 | End: 2020-01-01

## 2020-01-01 RX ORDER — MONTELUKAST SODIUM 10 MG/1
10 TABLET ORAL NIGHTLY
Status: DISCONTINUED | OUTPATIENT
Start: 2020-01-01 | End: 2020-01-01

## 2020-01-01 RX ORDER — METOCLOPRAMIDE HYDROCHLORIDE 5 MG/ML
5 INJECTION INTRAMUSCULAR; INTRAVENOUS EVERY 8 HOURS PRN
Status: DISCONTINUED | OUTPATIENT
Start: 2020-01-01 | End: 2020-01-01

## 2020-01-01 RX ORDER — 0.9 % SODIUM CHLORIDE 0.9 %
VIAL (ML) INJECTION
Status: COMPLETED
Start: 2020-01-01 | End: 2020-01-01

## 2020-01-01 RX ORDER — TRAMADOL HYDROCHLORIDE 50 MG/1
50 TABLET ORAL EVERY 12 HOURS PRN
Qty: 20 TABLET | Refills: 0 | Status: SHIPPED | OUTPATIENT
Start: 2020-01-01

## 2020-01-01 RX ORDER — HYDROMORPHONE HYDROCHLORIDE 1 MG/ML
0.5 INJECTION, SOLUTION INTRAMUSCULAR; INTRAVENOUS; SUBCUTANEOUS ONCE
Status: COMPLETED | OUTPATIENT
Start: 2020-01-01 | End: 2020-01-01

## 2020-01-01 RX ORDER — OXYCODONE HYDROCHLORIDE 5 MG/1
10 TABLET ORAL EVERY 4 HOURS PRN
Status: DISCONTINUED | OUTPATIENT
Start: 2020-01-01 | End: 2020-01-01

## 2020-01-01 RX ORDER — VANCOMYCIN HYDROCHLORIDE 125 MG/1
125 CAPSULE ORAL 2 TIMES DAILY
Qty: 60 CAPSULE | Refills: 0 | Status: SHIPPED | OUTPATIENT
Start: 2020-01-01 | End: 2020-01-01

## 2020-01-01 RX ORDER — VANCOMYCIN HYDROCHLORIDE 125 MG/1
125 CAPSULE ORAL 2 TIMES DAILY
Status: DISCONTINUED | OUTPATIENT
Start: 2020-01-01 | End: 2020-01-01 | Stop reason: DRUGHIGH

## 2020-01-01 RX ORDER — PROCHLORPERAZINE MALEATE 10 MG
10 TABLET ORAL EVERY 8 HOURS PRN
Status: DISCONTINUED | OUTPATIENT
Start: 2020-01-01 | End: 2020-01-01

## 2020-01-01 RX ORDER — DICYCLOMINE HYDROCHLORIDE 10 MG/1
10 CAPSULE ORAL 3 TIMES DAILY PRN
Status: DISCONTINUED | OUTPATIENT
Start: 2020-01-01 | End: 2020-01-01

## 2020-01-01 RX ORDER — HYDROMORPHONE HYDROCHLORIDE 1 MG/ML
0.4 INJECTION, SOLUTION INTRAMUSCULAR; INTRAVENOUS; SUBCUTANEOUS EVERY 2 HOUR PRN
Status: DISCONTINUED | OUTPATIENT
Start: 2020-01-01 | End: 2020-01-01

## 2020-01-01 RX ORDER — VANCOMYCIN HYDROCHLORIDE
15 EVERY 12 HOURS
Status: DISCONTINUED | OUTPATIENT
Start: 2020-01-01 | End: 2020-01-01

## 2020-01-01 RX ORDER — GARLIC EXTRACT 500 MG
1 CAPSULE ORAL 2 TIMES DAILY
Status: DISCONTINUED | OUTPATIENT
Start: 2020-01-01 | End: 2020-01-01

## 2020-01-01 RX ORDER — ACETAMINOPHEN 650 MG/1
650 SUPPOSITORY RECTAL EVERY 6 HOURS PRN
Status: DISCONTINUED | OUTPATIENT
Start: 2020-01-01 | End: 2020-01-01

## 2020-01-01 RX ORDER — CEFUROXIME AXETIL 500 MG/1
500 TABLET ORAL DAILY
Qty: 7 TABLET | Refills: 0 | Status: ON HOLD | OUTPATIENT
Start: 2020-01-01 | End: 2020-01-01

## 2020-01-01 RX ORDER — HEPARIN SODIUM AND DEXTROSE 10000; 5 [USP'U]/100ML; G/100ML
18 INJECTION INTRAVENOUS ONCE
Status: COMPLETED | OUTPATIENT
Start: 2020-01-01 | End: 2020-01-01

## 2020-01-01 RX ORDER — WARFARIN SODIUM 10 MG/1
10 TABLET ORAL NIGHTLY
Qty: 30 TABLET | Refills: 0 | Status: ON HOLD | OUTPATIENT
Start: 2020-01-01 | End: 2020-01-01

## 2020-01-01 RX ORDER — TRAMADOL HYDROCHLORIDE 50 MG/1
50 TABLET ORAL EVERY 4 HOURS PRN
Status: DISCONTINUED | OUTPATIENT
Start: 2020-01-01 | End: 2020-01-01

## 2020-01-01 RX ORDER — VANCOMYCIN HYDROCHLORIDE 125 MG/1
125 CAPSULE ORAL EVERY 6 HOURS
Status: DISCONTINUED | OUTPATIENT
Start: 2020-01-01 | End: 2020-01-01

## 2020-01-01 RX ORDER — ACETAMINOPHEN 325 MG/1
650 TABLET ORAL EVERY 4 HOURS PRN
Status: DISCONTINUED | OUTPATIENT
Start: 2020-01-01 | End: 2020-01-01

## 2020-01-01 RX ORDER — WARFARIN SODIUM 5 MG/1
10 TABLET ORAL NIGHTLY
Status: DISCONTINUED | OUTPATIENT
Start: 2020-01-01 | End: 2020-01-01

## 2020-01-01 RX ORDER — LIDOCAINE HYDROCHLORIDE 20 MG/ML
INJECTION, SOLUTION EPIDURAL; INFILTRATION; INTRACAUDAL; PERINEURAL
Status: COMPLETED
Start: 2020-01-01 | End: 2020-01-01

## 2020-01-01 RX ORDER — BACLOFEN 10 MG/1
10 TABLET ORAL 3 TIMES DAILY PRN
Status: DISCONTINUED | OUTPATIENT
Start: 2020-01-01 | End: 2020-01-01

## 2020-01-01 RX ORDER — CLOTRIMAZOLE AND BETAMETHASONE DIPROPIONATE 10; .64 MG/G; MG/G
CREAM TOPICAL 2 TIMES DAILY
Status: DISCONTINUED | OUTPATIENT
Start: 2020-01-01 | End: 2020-01-01

## 2020-01-01 RX ORDER — OXYCODONE HYDROCHLORIDE 10 MG/1
5 TABLET ORAL EVERY 6 HOURS PRN
Qty: 20 TABLET | Refills: 0 | Status: SHIPPED | OUTPATIENT
Start: 2020-01-01 | End: 2020-01-01

## 2020-01-01 RX ORDER — SODIUM CHLORIDE 9 MG/ML
INJECTION, SOLUTION INTRAVENOUS ONCE
Status: COMPLETED | OUTPATIENT
Start: 2020-01-01 | End: 2020-01-01

## 2020-01-01 RX ORDER — TRAZODONE HYDROCHLORIDE 50 MG/1
50 TABLET ORAL NIGHTLY PRN
Status: DISCONTINUED | OUTPATIENT
Start: 2020-01-01 | End: 2020-01-01

## 2020-01-01 RX ORDER — DEXTROSE MONOHYDRATE 25 G/50ML
50 INJECTION, SOLUTION INTRAVENOUS
Status: DISCONTINUED | OUTPATIENT
Start: 2020-01-01 | End: 2020-01-01

## 2020-01-01 RX ORDER — VANCOMYCIN HYDROCHLORIDE 125 MG/1
125 CAPSULE ORAL 2 TIMES DAILY
Status: DISCONTINUED | OUTPATIENT
Start: 2020-01-01 | End: 2020-01-01

## 2020-01-01 RX ORDER — ENOXAPARIN SODIUM 100 MG/ML
1 INJECTION SUBCUTANEOUS 2 TIMES DAILY
Status: DISCONTINUED | OUTPATIENT
Start: 2020-01-01 | End: 2020-01-01

## 2020-01-01 RX ORDER — POTASSIUM CHLORIDE 20 MEQ/1
20 TABLET, EXTENDED RELEASE ORAL ONCE
Status: COMPLETED | OUTPATIENT
Start: 2020-01-01 | End: 2020-01-01

## 2020-01-01 RX ORDER — HYDROMORPHONE HYDROCHLORIDE 1 MG/ML
1.2 INJECTION, SOLUTION INTRAMUSCULAR; INTRAVENOUS; SUBCUTANEOUS EVERY 2 HOUR PRN
Status: DISCONTINUED | OUTPATIENT
Start: 2020-01-01 | End: 2020-01-01

## 2020-01-01 RX ORDER — VANCOMYCIN 2 GRAM/500 ML IN 0.9 % SODIUM CHLORIDE INTRAVENOUS
25 ONCE
Status: COMPLETED | OUTPATIENT
Start: 2020-01-01 | End: 2020-01-01

## 2020-01-01 RX ORDER — DOCUSATE SODIUM 100 MG/1
100 CAPSULE, LIQUID FILLED ORAL 2 TIMES DAILY
Status: DISCONTINUED | OUTPATIENT
Start: 2020-01-01 | End: 2020-01-01

## 2020-01-01 RX ORDER — GARLIC EXTRACT 500 MG
1 CAPSULE ORAL DAILY
Status: DISCONTINUED | OUTPATIENT
Start: 2020-01-01 | End: 2020-01-01

## 2020-01-01 RX ORDER — ALBUTEROL SULFATE 90 UG/1
2 AEROSOL, METERED RESPIRATORY (INHALATION) EVERY 6 HOURS PRN
Status: DISCONTINUED | OUTPATIENT
Start: 2020-01-01 | End: 2020-01-01

## 2020-01-01 RX ORDER — MAGNESIUM SULFATE 1 G/100ML
1 INJECTION INTRAVENOUS ONCE
Status: COMPLETED | OUTPATIENT
Start: 2020-01-01 | End: 2020-01-01

## 2020-01-01 RX ORDER — ECHINACEA PURPUREA EXTRACT 125 MG
1 TABLET ORAL
Status: DISCONTINUED | OUTPATIENT
Start: 2020-01-01 | End: 2020-01-01

## 2020-01-01 RX ORDER — SACCHAROMYCES BOULARDII 250 MG
250 CAPSULE ORAL 2 TIMES DAILY
COMMUNITY

## 2020-01-01 RX ORDER — CEFUROXIME AXETIL 500 MG/1
500 TABLET ORAL EVERY 12 HOURS SCHEDULED
Status: DISCONTINUED | OUTPATIENT
Start: 2020-01-01 | End: 2020-01-01

## 2020-01-01 RX ORDER — SODIUM BICARBONATE 650 MG/1
650 TABLET ORAL 3 TIMES DAILY
Status: DISCONTINUED | OUTPATIENT
Start: 2020-01-01 | End: 2020-01-01

## 2020-01-01 RX ORDER — WARFARIN SODIUM 5 MG/1
5 TABLET ORAL NIGHTLY
Status: DISCONTINUED | OUTPATIENT
Start: 2020-01-01 | End: 2020-01-01

## 2020-01-01 RX ORDER — SODIUM CHLORIDE, SODIUM LACTATE, POTASSIUM CHLORIDE, CALCIUM CHLORIDE 600; 310; 30; 20 MG/100ML; MG/100ML; MG/100ML; MG/100ML
INJECTION, SOLUTION INTRAVENOUS CONTINUOUS
Status: DISCONTINUED | OUTPATIENT
Start: 2020-01-01 | End: 2020-01-01

## 2020-01-01 RX ORDER — LORAZEPAM 0.5 MG/1
0.25 TABLET ORAL 2 TIMES DAILY PRN
Status: DISCONTINUED | OUTPATIENT
Start: 2020-01-01 | End: 2020-01-01

## 2020-01-01 RX ORDER — SODIUM BICARBONATE 650 MG/1
650 TABLET ORAL 3 TIMES DAILY
Qty: 90 TABLET | Refills: 0 | Status: SHIPPED | OUTPATIENT
Start: 2020-01-01 | End: 2020-01-01

## 2020-01-01 RX ORDER — MIDAZOLAM HYDROCHLORIDE 1 MG/ML
INJECTION INTRAMUSCULAR; INTRAVENOUS
Status: COMPLETED
Start: 2020-01-01 | End: 2020-01-01

## 2020-04-03 PROBLEM — E11.65 TYPE 2 DIABETES MELLITUS WITH HYPERGLYCEMIA, WITHOUT LONG-TERM CURRENT USE OF INSULIN (HCC): Status: ACTIVE | Noted: 2020-01-01

## 2020-04-06 PROBLEM — Z79.4 TYPE 2 DIABETES MELLITUS WITH STAGE 3 CHRONIC KIDNEY DISEASE, WITH LONG-TERM CURRENT USE OF INSULIN (HCC): Status: ACTIVE | Noted: 2020-01-01

## 2020-04-06 PROBLEM — D64.9 ANEMIA, UNSPECIFIED TYPE: Status: ACTIVE | Noted: 2020-01-01

## 2020-04-06 PROBLEM — N18.30 TYPE 2 DIABETES MELLITUS WITH STAGE 3 CHRONIC KIDNEY DISEASE, WITH LONG-TERM CURRENT USE OF INSULIN (HCC): Status: ACTIVE | Noted: 2020-01-01

## 2020-04-06 PROBLEM — E11.22 TYPE 2 DIABETES MELLITUS WITH STAGE 3 CHRONIC KIDNEY DISEASE, WITH LONG-TERM CURRENT USE OF INSULIN (HCC): Status: ACTIVE | Noted: 2020-01-01

## 2020-04-06 PROBLEM — N18.30 CKD (CHRONIC KIDNEY DISEASE) STAGE 3, GFR 30-59 ML/MIN (HCC): Status: ACTIVE | Noted: 2020-01-01

## 2020-05-18 PROBLEM — C25.0 MALIGNANT NEOPLASM OF HEAD OF PANCREAS (HCC): Status: ACTIVE | Noted: 2020-01-01

## 2020-07-17 NOTE — PROGRESS NOTES
Reason for visit: Mr. Diann Díaz was seen for the purposes of genetic counseling due to his recent diagnosis of pancreatic adenocarcinoma at age 60 and a family history of melanoma and prostate cancer.  The purpose of this visit was to review information 50-60s. The rest of the family history is negative for other significant genetic conditions, cancers, or birth defects of any kind. See scanned pedigree for full family history reported during the session.     Summary: Hereditary/familial factors are believ gene other than those being tested. A variant of uncertain significance means that a change has been identified a cancer susceptibility gene; however, it is uncertain if the variant is pathogenic or a non-deleterious change.   With time, the variant may visit Mr. Allen Tong elected to proceed with genetic testing for hereditary pancreatic and other cancer and the  [facility] Detect Hereditary Pancreatic Cancer Program (92 genes) was ordered.  Blood was drawn and sent to [facility] in [place] with

## 2020-07-23 ENCOUNTER — APPOINTMENT (OUTPATIENT)
Dept: INFUSION THERAPY | Age: 65
End: 2020-07-23

## 2020-08-04 ENCOUNTER — LAB SERVICES (OUTPATIENT)
Dept: LAB | Age: 65
End: 2020-08-04

## 2020-08-04 DIAGNOSIS — Z11.59 SPECIAL SCREENING EXAMINATION FOR UNSPECIFIED VIRAL DISEASE: ICD-10-CM

## 2020-08-04 DIAGNOSIS — Z11.59 SPECIAL SCREENING EXAMINATION FOR UNSPECIFIED VIRAL DISEASE: Primary | ICD-10-CM

## 2020-08-04 PROCEDURE — U0003 INFECTIOUS AGENT DETECTION BY NUCLEIC ACID (DNA OR RNA); SEVERE ACUTE RESPIRATORY SYNDROME CORONAVIRUS 2 (SARS-COV-2) (CORONAVIRUS DISEASE [COVID-19]), AMPLIFIED PROBE TECHNIQUE, MAKING USE OF HIGH THROUGHPUT TECHNOLOGIES AS DESCRIBED BY CMS-2020-01-R: HCPCS | Performed by: INTERNAL MEDICINE

## 2020-08-04 NOTE — TELEPHONE ENCOUNTER
Referring Provider:  Trang Jacobs      Reason for Referral:  Mr. Freddy Wang had genetic testing performed on 7/17/2020 because of a recent diagnosis of pancreatic cancer.       Genetic Testing Result:  No pathogenic variant was found in the following 91 genes: A earlier age than recommended for the general population. All medical management decisions should be made with a physician. Mr. Edyta Klein was found to have a CTNNA1 c.370A>G (p.Lrb239Gda) VUS and a POLD1 c.532G>C (p.Ctk403Yex) VUS.  A variant of ayden

## 2020-08-05 LAB
SARS-COV-2 RNA RESP QL NAA+PROBE: NOT DETECTED
SERVICE CMNT-IMP: NORMAL
SPECIMEN SOURCE: NORMAL

## 2020-08-17 PROBLEM — L03.116 CELLULITIS OF LEFT LOWER EXTREMITY: Status: ACTIVE | Noted: 2020-01-01

## 2020-08-17 PROBLEM — C25.9 MALIGNANT NEOPLASM OF PANCREAS, UNSPECIFIED LOCATION OF MALIGNANCY (HCC): Status: ACTIVE | Noted: 2020-01-01

## 2020-08-17 PROBLEM — N17.9 ACUTE KIDNEY INJURY (HCC): Status: ACTIVE | Noted: 2020-01-01

## 2020-08-17 NOTE — ED INITIAL ASSESSMENT (HPI)
Patient aox3 to ed via private vehicle patient co of ble swelling. Has been taking abx for legs x x 2 weeks, not improving per patient. Hx of pancreatic ca. Last chemo tx Friday. Fever x yesterday.  7/10 pain

## 2020-08-17 NOTE — PROGRESS NOTES
[facility] Dosing Service    Initial Pharmacokinetic Consult for Vancomycin Dosing     Rolo Leon is a 60year old patient who is being treated for cellulitis.   Pharmacy has been asked to dose Vancomycin by Dr. Norris Shows    Patient has no known

## 2020-08-17 NOTE — ED NOTES
Pt has CGM in place. Pt states his glucose is 107 mg/dl. Pt has tray at bedside. Belongings gathered and sent with patient.

## 2020-08-17 NOTE — ED NOTES
Orders for admission, patient is aware of plan and ready to go upstairs. Any questions, please call ED RN Moon Fleming at extension [phone].

## 2020-08-17 NOTE — ED PROVIDER NOTES
Patient Seen in: [facility] AND [facility] Emergency Department      History   Patient presents with:  Swelling Edema    Stated Complaint: left leg swelling     HPI    The patient is a 80-year-old male with a history of pancreatic cancer, diabetes hypertension o Normocephalic and atraumatic. Mouth/Throat:      Mouth: Mucous membranes are moist.   Eyes:      Conjunctiva/sclera: Conjunctivae normal.      Pupils: Pupils are equal, round, and reactive to light.    Neck:      Musculoskeletal: Normal range of motion CBC W/ DIFFERENTIAL - Abnormal; Notable for the following components:    WBC 12.4 (*)     RBC 3.09 (*)     HGB 9.3 (*)     HCT 29.1 (*)     RDW-SD 66.6 (*)     RDW 19.3 (*)     Neutrophil Absolute Prelim 11.97 (*)     Neutrophil Absolute 11.97 (*)     Ly Cellulitis of left lower extremity L03. 116 8/17/2020 Unknown

## 2020-08-17 NOTE — ED NOTES
Pt to ED by self for worsening redness/warmth/swelling to bilateral legs- left worse than right. Pt currently on oral antibiotics for cellulitis since 8-. Pt states intermittent fever and chills since yesterday. Bilateral pedal pulses palpated.  Pt a

## 2020-08-18 NOTE — CONSULTS
[facility] Infectious Disease  Report of Consultation    [facility] Patient Status:  Inpatient    1955 MRN [id number]   Location [facility] 5SW/SE Attending Kelley Carvalho MD   Hosp Day # 1 PCP Brian Hollins     Mohsen 650 mg, Oral, Q6H PRN  •  PEG 3350 (MIRALAX) powder packet 17 g, 17 g, Oral, Daily PRN  •  magnesium hydroxide (MILK OF MAGNESIA) 400 MG/5ML suspension 30 mL, 30 mL, Oral, Daily PRN  •  bisacodyl (DULCOLAX) rectal suppository 10 mg, 10 mg, Rectal, Daily ND 1-7 Units, 1-7 Units, Subcutaneous, TID CC and HS  •  Pt's own: CREON 82707 UNITS CAPSULES, 72,000 Units, Oral, TID CC  •  Pt's OWN: Pancrelipase (Lip-Prot-Amyl) CPEP 36,000 Units Capsule, 36,000 Units, Oral, With Snacks    Review of Systems:    Constituti Exam:   General: Awake, alert, non-tox and in NAD. Head: Normocephalic, without obvious abnormality, atraumatic. Eyes: Conjunctivae/corneas clear. No scleral icterus. No conjunctival     hemorrhage.    Nose: Nares normal.   Throat:  Oropharynx clear, 14 days followed by chronic suppression given chemo needs. D/w patient at length. Final choice, route, and duration of antibiotics to be determined. Karlee Daley  [facility] Infectious Disease  [phone]    8/18/2020  1:52 PM

## 2020-08-18 NOTE — PHYSICAL THERAPY NOTE
PHYSICAL THERAPY EVALUATION - INPATIENT     Room Number: 573/573-A  Evaluation Date: 8/18/2020  Type of Evaluation: Initial   Physician Order: PT Eval and Treat    Presenting Problem: LLE cellulitis; (-) DVT LLE; CLOVER; fever; chills; hx metastatic pancreat education; Energy conservation; Endurance; Body mechanics; Coordination;Balance training;Stair training;Stoop training;Strengthening  Rehab Potential : Fair  Frequency (Obs): 5x/week       PHYSICAL THERAPY MEDICAL/SOCIAL HISTORY     History related to current alleviate this pain during walking. PHYSICAL THERAPY EXAMINATION     OBJECTIVE  Precautions: (Enteric/Contact PLUS: C.  Diff)  Fall Risk: Standard fall risk    WEIGHT BEARING RESTRICTION                   PAIN ASSESSMENT  Rating: (did not rate)  Locatio within reach;RN aware of session/findings; All patient questions and concerns addressed; In bathroom - nursing staff aware(In bathroom: RN [facility] notified in person)    CURRENT GOALS    Goals to be met by: 9/1/2020  Patient Goal Patient's self-stated goal

## 2020-08-18 NOTE — CDS QUERY
Clarification – Diabetes Mellitus  CLINICAL DOCUMENTATION CLARIFICATION FORM  Dear Doctor:  Clinical information in the patient's medical record (provided below) includes documentation of Diabetes Mellitus.  For accurate ICD-10-CM code assignment to reflect

## 2020-08-18 NOTE — PAYOR COMM NOTE
--------------  ADMISSION REVIEW     Payor: Paz KWAN  Subscriber #:  [id number]  Authorization Number: [id number]    Admit date: 8/17/20  Admit time: 1823       Patient Seen in: [facility] Emergency Department    History   Patient presents Skin is warm and dry. Findings: Erythema (Left lower extremity to knee with warmth and mild tenderness soft tissue) present. Neurological:      General: No focal deficit present.       Mental Status: He is alert and oriented to person, place, and mu been seen at the [facility] multiple times with trial of keflex then bactrim and could not tolerate clinda. Continues to have fevers and chills at home with worsening of redness and swelling.     Left LE cellulitis  -doppler 8/13 neg for DVT  -failed out pt treatm with marked erythema and warmth of left lower leg sparing the toes and up to the knees  Skin: no rashes or lesions, well perfused  Psych: mood stable, cooperative  Neuro: No focal deficits    Lab 08/11/20  1244 08/13/20  0938 08/17/20  1440   WBC 3.59* 3.5 IVF, down from 9's to 7.4, suspect he will need blood in near future, repeat in AM and type and screen     HTN, HL, afib  -cont home meds as tolerated     Type 2 DM, hypoglycemia  -not taking insulin any longer, hold po and SSI  -a1c 5.2 in July 2020, may 8/17/2020 2307 Given 650 mg Oral Elis Osorio RN      Acidophilus/Pectin (PROBIOTIC) CAPS 1 capsule     Date Action Dose Route User    8/18/2020 6260 Given 1 capsule Oral Jose Guadalupe Bustillo RN      glucose (DEX4) oral liquid 15 g     Date Action Dose Rou Bag 1000 mL Intravenous Arthuro Hilary CORRAL RN      sodium chloride 0.9% IV bolus 1,000 mL     Date Action Dose Route User    8/17/2020 2127 New Bag 1000 mL Intravenous Angeline Spurling, RN      traMADol HCl [facility] tab 50 mg     Date Action Dose Route User

## 2020-08-18 NOTE — PLAN OF CARE
Problem: Patient Centered Care  Goal: Patient preferences are identified and integrated in the patient's plan of care  Description  Interventions:  - What would you like us to know as we care for you?  I live home alone  - Provide timely, complete, and ac appropriate and evaluate response  - Consider cultural and social influences on pain and pain management  - Manage/alleviate anxiety  - Utilize distraction and/or relaxation techniques  - Monitor for opioid side effects  - Notify MD/LIP if interventions un for coordinating discharge planning if the patient needs post-hospital services based on physician/LIP order or complex needs related to functional status, cognitive ability or social support system  Outcome: Progressing     Problem: METABOLIC/FLUID AND EL

## 2020-08-18 NOTE — PLAN OF CARE
Problem: Patient Centered Care  Goal: Patient preferences are identified and integrated in the patient's plan of care  Description  Interventions:  - What would you like us to know as we care for you?  I live home alone  - Provide timely, complete, and ac appropriate and evaluate response  - Consider cultural and social influences on pain and pain management  - Manage/alleviate anxiety  - Utilize distraction and/or relaxation techniques  - Monitor for opioid side effects  - Notify MD/LIP if interventions un for coordinating discharge planning if the patient needs post-hospital services based on physician/LIP order or complex needs related to functional status, cognitive ability or social support system  Outcome: Progressing     Problem: METABOLIC/FLUID AND EL Will continue to monitor.

## 2020-08-18 NOTE — PROGRESS NOTES
[facility] Hospitalist Progress Note     PCP: Kamala Son    CC: Follow up       Assessment/Plan:   Yakelin Coles is a 60year old male with PMH sig for afib, DVT on lovenox, type 2 DM, GERD, HTN.  HL, LONDON and pancreatic cancer  presented with worsening Barry Anidno M.D. 8/18/20  [facility] Hospitalist  Answering Service: [phone]        Subjective     Feels ok today, no current abd pain.   Legs are about the same No CP, SOB, or N/V.    tmax 101.8      Objective     OBJECTIVE:  Temp:  [98.6 ° LORazepam, traMADol HCl, glucose **OR** Glucose-Vitamin C **OR** dextrose **OR** glucose **OR** Glucose-Vitamin C, glucose **OR** Glucose-Vitamin C **OR** dextrose **OR** glucose **OR** Glucose-Vitamin C, Pancrelipase (Lip-Prot-Amyl)    Data Review:

## 2020-08-18 NOTE — RESPIRATORY THERAPY NOTE
Pt has a history of LONDON and routinely uses cpap at home. Hospital's equipment is setup and available at the bedside.

## 2020-08-18 NOTE — CM/SW NOTE
08/18/20 1200   CM/SW Screening   Referral [facility] staff; Chart review;Nursing rounds   Patient's Mental Status Alert;Oriented   Patient lives with Alone   Patient Status Prior to Admission   Independent with ADLs and Mo

## 2020-08-19 NOTE — PROGRESS NOTES
[facility] dosing service    Follow-up Pharmacokinetic Consult for Vancomycin Dosing     Jad Phillips is a 60year old patient who is being treated for cellulitis.    Patient is on day 3 of Vancomycin and is currently receiving 1.5 gm IV Q 12 hours

## 2020-08-19 NOTE — OCCUPATIONAL THERAPY NOTE
OCCUPATIONAL THERAPY EVALUATION - INPATIENT     Room Number: 573/573-A  Evaluation Date: 8/19/2020  Type of Evaluation: Initial  Presenting Problem: L LE cellulitis    Physician Order: IP Consult to Occupational Therapy  Reason for Therapy: ADL/IADL Dysfun education;Patient/Family training       OCCUPATIONAL THERAPY MEDICAL/SOCIAL HISTORY     Problem List  Principal Problem:    Cellulitis of left lower extremity  Active Problems:    Anemia, unspecified type    Acute kidney injury ([facility] [address]    Malignant neoplasm ACTIVITIES OF DAILY LIVING ASSESSMENT  AM-PAC ‘6-Clicks’ Inpatient Daily Activity Short Form  How much help from another person does the patient currently need…  -   Putting on and taking off regular lower body clothing?: A Little  -   Bathing (inc

## 2020-08-19 NOTE — OCCUPATIONAL THERAPY NOTE
OT orders received, chart reviewed, eval attempted however pt eating breakfast and requested therapy to return later today. Will re-attempt later as schedule allows.

## 2020-08-19 NOTE — PLAN OF CARE
Problem: Patient Centered Care  Goal: Patient preferences are identified and integrated in the patient's plan of care  Description  Interventions:  - What would you like us to know as we care for you?  I live home alone  - Provide timely, complete, and ac pt to call for assistance with activity based on assessment  - Modify environment to reduce risk of injury  - Provide assistive devices as appropriate  - Consider OT/PT consult to assist with strengthening/mobility  - Encourage toileting schedule  Outcome: Initiate interventions, skin care algorithm/standards of care as needed  Outcome: Progressing     Problem: PAIN - ADULT  Goal: Verbalizes/displays adequate comfort level or patient's stated pain goal  Description  INTERVENTIONS:  - Encourage pt to monitor CPAP. Left chest port-a-cath with no access. Bed in lowest position, call light within reach, bed alarm on. Will continue to monitor. Vanco trough result 13; Pharmacy states to keep the same IV dose.

## 2020-08-19 NOTE — PAYOR COMM NOTE
--------------  CONTINUED STAY REVIEW    Payor: Alexa SPEARS/SHIVANI  Subscriber #:  [id number]  Authorization Number: [id number]    Admit date: 8/17/20  Admit time: 80     FAXING CLINICAL UPDATE FOR 8/19/20 8/19/20   Objective:  Blood pressure 103/58, 8/18/2020 2227 Given 650 mg Oral Sung Denier, RN      Acidophilus/Pectin (PROBIOTIC) CAPS 1 capsule     Date Action Dose Route User    8/19/2020 0829 Given 1 capsule Oral Luis Hayes RN    8/18/2020 2105 Given 1 capsule Oral Ck Mendoza RN 8/19/2020 0347 Given 125 mg Oral Zachery Truong RN    8/18/2020 2105 Given 125 mg Oral Zachery Truong RN    8/18/2020 1642 Given 125 mg Oral Ney Garza RN      vancomycin IVPB premix 1.5g in 0.9% NaCl 250 mL     Date Action Dose Route User    8/19/20

## 2020-08-19 NOTE — PROGRESS NOTES
[facility] Hospitalist Progress Note     PCP: Jeane Valencia    CC: Follow up       Assessment/Plan:   Yaima Markham is a 60year old male with PMH sig for afib, DVT on lovenox, type 2 DM, GERD, HTN.  HL, LONDON and pancreatic cancer  presented with worsening complaints      Objective     OBJECTIVE:  Temp:  [98.3 °F (36.8 °C)-100.7 °F (38.2 °C)] 99 °F (37.2 °C)  Pulse:  [85-95] 85  Resp:  [16-18] 16  BP: (103-117)/(58-67) 112/67    Intake/Output:    Intake/Output Summary (Last 24 hours) at 8/19/2020 6292  Last **OR** dextrose **OR** glucose **OR** Glucose-Vitamin C, Pancrelipase (Lip-Prot-Amyl)    Data Review:       Labs:     Recent Labs   Lab 08/13/20  0938 08/17/20  1440 08/18/20  0218 08/19/20  0551   WBC 3.51* 12.4* 7.9 2.9*   HGB 9.8* 9.3* 7.4* 7.6*   MCV 9

## 2020-08-19 NOTE — PROGRESS NOTES
[facility] AND [facility] Infectious Disease  Progress Note    Naomi Jones Patient Status:  Inpatient    1955 MRN [id number]   Location [facility] 5SW/SE Attending Jose Stafford, [facility]  Day # 2 PCP Sarath Brown     Subjective:  Vielka Braydon pancreatic cancer  - Worsening on recent imaging  - Due for chemo on Friday     4. Disposition - inpatient. Continue IV vancomycin. Continue p.o. vancomycin for c.diff. Trending temps and WBCs. Supportive care ongoing. Karlee Jones

## 2020-08-19 NOTE — PLAN OF CARE
Problem: Patient Centered Care  Goal: Patient preferences are identified and integrated in the patient's plan of care  Description  Interventions:  - What would you like us to know as we care for you?  I live home alone  - Provide timely, complete, and ac appropriate and evaluate response  - Consider cultural and social influences on pain and pain management  - Manage/alleviate anxiety  - Utilize distraction and/or relaxation techniques  - Monitor for opioid side effects  - Notify MD/LIP if interventions un for coordinating discharge planning if the patient needs post-hospital services based on physician/LIP order or complex needs related to functional status, cognitive ability or social support system  Outcome: Progressing     Problem: METABOLIC/FLUID AND EL Isolation per protocol.

## 2020-08-20 NOTE — HOME CARE LIAISON
Received referral from An Snyder. Met with patient at the bedside to discuss home health services and offer choice. Patient is agreeable to [facility] services at discharge. Brochure and contact information provided. Any questions addressed. Will follow.

## 2020-08-20 NOTE — CM/SW NOTE
MDO to SRIRAM for IV Abx. Pt will require IV vancomycin if home infusion or daptomycin if infusion center x 2 weeks. SRIRAM uploaded infusion referral via Aidin. Pt was referred to w/[facility]. Plan:  DC home with IV Abx and [address]. Infusion pending.     SRIRAM/DERIC to r

## 2020-08-20 NOTE — CM/SW NOTE
PT rec is C-PT. Pt referred to [facility] during nsg rounds. MDO for outpt iv abx. Cm spoke with pt re dc planning on iv abx. Pt given choices and is agreeable with Vancomycin Q12 at home. Pt sts he will have one of his dtrs learn with him.     Script

## 2020-08-20 NOTE — PROGRESS NOTES
[facility] AND [facility] Infectious Disease Progress Note    Page Lem[facility] Patient Status:  Inpatient    1955 MRN [id number]   Location [facility] 5SW/SE Attending Wai Metcalf MD   Hosp Day # 3 PCP Jaylon Bhandari     Subjective:  Pt wit Glucose-Vitamin C (DEX-4) chewable tab 4 tablet, 4 tablet, Oral, Q15 Min PRN **OR** dextrose 50 % injection 50 mL, 50 mL, Intravenous, Q15 Min PRN **OR** glucose (DEX4) oral liquid 30 g, 30 g, Oral, Q15 Min PRN **OR** Glucose-Vitamin C (DEX-4) chewable tab warmth  Neurologic: Cranial nerves are grossly intact. Motor strength and sensory examination is grossly normal.  No focal neurologic deficit.     Labs:  Lab Results   Component Value Date    CREATSERUM 0.77 08/20/2020    BUN 18 08/20/2020     08/20/

## 2020-08-20 NOTE — PAYOR COMM NOTE
--------------  PLEASE FAX INPT DAYS AUTHORIZED ALONG WITH NRD [phone]    [facility] YOU    8/20  CONTINUED STAY REVIEW    Payor: [address] [place] [id number] POS/SHIVANI  Subscriber #:  [id number]  Authorization Number: [id number]    [facility] Hospitalist Progress Note      PCP:    Borderline febrile last night to 100.3 - but received tylenol     Last BM Tuesday - no diarrhea.     Very concerned about whether he'll have chemo tomorrow or not - will touch base with Dr. Abdi Celestin - my guess is it will be delayed but will defer to onc. CM/SRIRAM Note   Addendum   Date of Service:  8/20/2020  3:33 PM                    MDO to SRIRAM for IV Abx. Pt will require IV vancomycin if home infusion or daptomycin if infusion center x 2 weeks. SRIRAM uploaded infusion referral via Aidin.   Pt was referred [address] Units Oral Sebastian Mancia RN    8/19/2020 1305 Given 14126 Units Oral Sebastian Mancia RN      Pantoprazole Sodium (PROTONIX) EC tab 40 mg     Date Action Dose Route User    8/20/2020 1022 Given 40 mg Oral Agnieszka Amador RN      traM

## 2020-08-20 NOTE — PLAN OF CARE
Problem: Patient Centered Care  Goal: Patient preferences are identified and integrated in the patient's plan of care  Description  Interventions:  - What would you like us to know as we care for you?  I live home alone  - Provide timely, complete, and ac pt to call for assistance with activity based on assessment  - Modify environment to reduce risk of injury  - Provide assistive devices as appropriate  - Consider OT/PT consult to assist with strengthening/mobility  - Encourage toileting schedule  Outcome: Initiate interventions, skin care algorithm/standards of care as needed  Outcome: Progressing     Problem: PAIN - ADULT  Goal: Verbalizes/displays adequate comfort level or patient's stated pain goal  Description  INTERVENTIONS:  - Encourage pt to monitor upper arm and replaced it a few inches below last site; witnessed pt tolerate it well. Call light within reach, bed alarm on, non-skid socks on and bed in lowest position.

## 2020-08-20 NOTE — PROGRESS NOTES
[facility] Hospitalist Progress Note     PCP: Harris Ma    CC: Follow up       Assessment/Plan:   Celi Collins is a 60year old male with PMH sig for afib, DVT on lovenox, type 2 DM, GERD, HTN.  HL, LONDON and pancreatic cancer  presented with worsening Borderline febrile last night to 100.3 - but received tylenol    Last BM Tuesday - no diarrhea. Very concerned about whether he'll have chemo tomorrow or not - will touch base with Dr. Allan Macdonald - my guess is it will be delayed but will defer to onc. (Lip-Prot-Amyl)  72,000 Units Oral TID CC       Normal Saline Flush, acetaminophen, PEG 3350, magnesium hydroxide, bisacodyl, ondansetron HCl, Metoclopramide HCl, baclofen, LORazepam, traMADol HCl, glucose **OR** Glucose-Vitamin C **OR** dextrose **OR** gl Sean Chaney MD on 8/18/2020 at 3:04 PM     Finalized by (CST): Sean Chaney MD on 8/18/2020 at 3:06 PM

## 2020-08-21 NOTE — PAYOR COMM NOTE
--------------   CONTINUED STAY REVIEW    Payor: [address] [place] [address] REGAN/SHIVANI  Subscriber #:  [id number]  Authorization Number: [id number]    [facility] Infectious Disease Progress Note           Cindi Chu Patient Status:  Inpatient    1955 MRN [id number] / to remain available for support and/or discharge planning.      Romina Francisco, RN    Ext [phone]                  MEDICATIONS ADMINISTERED IN LAST 1 DAY:  Acidophilus/Pectin (PROBIOTIC) CAPS 1 capsule     Date Action 8/21/2020 0016 Given 50 mg Oral Alessio Ny RN    8/20/2020 1827 [facility] 50 mg [facility]    8/20/2020 1411 Given 50 mg Oral Teofilo NICK RN      vancomycin HCl (VANCOCIN) cap 125 mg     Date Action Dose Route User    8/21/2020 1034 Give

## 2020-08-21 NOTE — PROGRESS NOTES
[facility] AND [facility] Infectious Disease Progress Note    Page Sarojlyndsay Patient Status:  Inpatient    1955 MRN [id number]   Location [facility] 5SW/SE Attending Wai Metcalf MD   Hosp Day # 4 PCP Jaylon Bhandari     Subjective:  Pt wit C (DEX-4) chewable tab 4 tablet, 4 tablet, Oral, Q15 Min PRN **OR** dextrose 50 % injection 50 mL, 50 mL, Intravenous, Q15 Min PRN **OR** glucose (DEX4) oral liquid 30 g, 30 g, Oral, Q15 Min PRN **OR** Glucose-Vitamin C (DEX-4) chewable tab 8 tablet, 8 tab Cranial nerves are grossly intact. Motor strength and sensory examination is grossly normal.  No focal neurologic deficit. Assessment/Plan:    1.   LLE cellulitis  -failed outpatient PO cephalexin and bactrim  -PCT 5.07  -on IV vancomycin  2.  C diff

## 2020-08-21 NOTE — HOME CARE LIAISON
Home Health follow up:    Notified by [facility] that [facility] is unable to accept as there is no staffing availability for tomorrow for IV Abx.  Re-referred to the following [facility] agencies:    Lorena Hua   [facility]  [facility] - unable to accept d

## 2020-08-21 NOTE — PLAN OF CARE
Problem: Patient Centered Care  Goal: Patient preferences are identified and integrated in the patient's plan of care  Description  Interventions:  - What would you like us to know as we care for you?  I live home alone  - Provide timely, complete, and ac Kassandra RN  Outcome: Progressing     Problem: PAIN - ADULT  Goal: Verbalizes/displays adequate comfort level or patient's stated pain goal  Description  INTERVENTIONS:  - Encourage pt to monitor pain and request assistance  - Assess pain using appropriate Progressing     Problem: METABOLIC/FLUID AND ELECTROLYTES - ADULT  Goal: Electrolytes maintained within normal limits  Description  INTERVENTIONS:  - Monitor labs and rhythm and assess patient for signs and symptoms of electrolyte imbalances  - Administer

## 2020-08-21 NOTE — CM/SW NOTE
Cm reserved [facility] for home IV abx and [facility] as [facility] to provide PT and IV abx teach and train. CM confirmed with Enon at [facility] that they can start care with tomorrow am abx dose.     / to remain available for support and/or discharge

## 2020-08-23 NOTE — DISCHARGE SUMMARY
General Medicine Discharge Summary     Patient ID:  Mick Brunson  60year old  5/20/1955    Admit date: 8/17/2020    Discharge date and time: 8/21/2020  6:47 PM     Attending Physician: Ester Waller Friday, may have to hold - will discuss with Dr. Noam Wang  -f/u with onc as op     Portal vein thrombosis  -worsened on eliquis, now on loveox     Anemia 2/2 chemo  -tranfused earlier in August  -monitor closely, transfuse if <7     HTN, HL, afib  -cont home

## 2020-08-27 PROBLEM — N17.9 AKI (ACUTE KIDNEY INJURY) (HCC): Status: ACTIVE | Noted: 2020-01-01

## 2020-08-27 PROBLEM — N30.00 ACUTE CYSTITIS WITHOUT HEMATURIA: Status: ACTIVE | Noted: 2020-01-01

## 2020-08-27 PROBLEM — L03.119 CELLULITIS OF LOWER EXTREMITY, UNSPECIFIED LATERALITY: Status: ACTIVE | Noted: 2020-01-01

## 2020-08-27 PROBLEM — R63.8 DECREASED ORAL INTAKE: Status: ACTIVE | Noted: 2020-01-01

## 2020-08-27 NOTE — H&P
[facility]PITAL[facility] HISTORY AND PHYSICAL     CC: Patient presents with:  Abnormal Result       PCP: Magali Pinto    History of Present Illness:   Patient is a 60year old male with PMH sig for pancreatic adenocarcinoma, portal venous thrombus, afib, DM, GERD, normal resp effort  ABL soft, distended non tender, hypoactive BS  EXT: + LE edema b/l , DP pulses 2+ b/l  Neuro: sensation intact, no focal deficits  SKIN- bl LE erythema  PSYCH- normal mentation, normal affect      LABS:   Lab Results   Component Value D INDICATIONS: Evaluate for clot LLE (negative test 8/11 but worsening)  TECHNIQUE: Color duplex Doppler venous ultrasound of the left lower extremity was performed in the usual manner.   FINDINGS: The femoral and popliteal veins appear normal.  Normal flow w cellulitis and Cdiff colitis discharged on IV and PO vancomycin who presents with CLOVER.     CLOVER   Metabolic acidosis  Abdominal distension  Recent admit for  bl LE cellulitis and Cdiff infection, supratherapeutic vancomycin level  Pancreatic adenocarcinoma needed    GERD  - PPI     [place]  - CPAP qhs    Full code d/w pt and daughter at bedside      Outpatient records or previous hospital records reviewed as detailed above.     [facility]        [facility]

## 2020-08-27 NOTE — ED PROVIDER NOTES
Patient Seen in: [facility] Emergency Department    History   Patient presents with:  Abnormal Result    Stated Complaint: abnormal labs     HPI    29-year-old male with past medical history of Afib/portal vein thrombosis on Lovenox, DM, HTN,, dysli changing every 14 days   Continuous Blood Gluc  (FREESTYLE BILL READER) Does not apply Device,  USE AD DIRECTED   enoxaparin sodium 100 MG/ML Subcutaneous Solution,  Inject 0.85 mL (85 mg total) into the skin 2 (two) times daily.    traMADol HCl 50 complaint: abnormal labs  Other systems are as noted in HPI. Constitutional and vital signs reviewed. All other systems reviewed and negative except as noted above. PSFH elements reviewed from today and agreed except as otherwise stated in HPI. (*)     Lymphocyte Absolute 0.42 (*)     All other components within normal limits   RAPID SARS-COV-2 BY PCR - Normal   CBC WITH DIFFERENTIAL WITH PLATELET    Narrative:      The following orders were created for panel order CBC WITH DIFFERENTIAL WITH PLATE Reji Cedillo.    I was wearing at minimum a facemask and eye protection throughout this encounter with handwashing performed prior and after patient evaluation without personal hand/facial/oropharyngeal contact and gloves worn throughout encounter.  See note and/or

## 2020-08-27 NOTE — ED NOTES
Pt here with abnormal kidney function per Krish Santana MD- pt with history of pancreatic cancer- states that he had blood drawn today and was told to come to ED. Pt able to make small amount of urine. Denies fevers, chills. Redness noted to bilateral lower legs.

## 2020-08-28 NOTE — ED NOTES
Orders for admission, patient is aware of plan and ready to go upstairs. Any questions, please call ED RN Myrtle Schlatter  at extension [phone]. Pt states left port is due to be flushed tomorrow.

## 2020-08-28 NOTE — PROGRESS NOTES
Therapeutic interchange from creon 36,000 units to zenpep 40,000 units per P&T approved protocol.      Thank you,  Thuan Red, PharmD

## 2020-08-28 NOTE — PROGRESS NOTES
Therapeutic interchange from cyclosporine eye drops to artificial tears per P&T approved protocol.      Thank you,  Leatha Landaverde, PharmD

## 2020-08-28 NOTE — CM/SW NOTE
The pt. Is known to SW from previous admission at [facility]. The pt. Was recently discharged on 8/21. The pt. Is home alone in a single family home. The pt. Has been independent prior to admission with adls and ambulation. The pt.  Has been getting chemo for

## 2020-08-28 NOTE — CONSULTS
[address] [facility] - Nephrology  Inpatient Consultation    Jennifer Huang Patient Status:  Inpatient    1955 MRN [id number]   Location [facility] 4W/SW/SE Attending Hui Mello MD   [facility] Day # 1 PCP BID PRN  •  Montelukast Sodium (SINGULAIR) tab 10 mg, 10 mg, Oral, Nightly  •  Pantoprazole Sodium (PROTONIX) EC tab 40 mg, 40 mg, Oral, QAM AC  •  Prochlorperazine Maleate (COMPAZINE) tab 10 mg, 10 mg, Oral, Q8H PRN  •  Acidophilus/Pectin (PROBIOTIC) CAPS Syringe, Rfl: 5, 8/27/2020 at 0900  Omeprazole 40 MG Oral Capsule Delayed Release, Take 40 mg by mouth daily. , Disp: , Rfl: , 8/27/2020 at 0730  dilTIAZem HCl ER Coated Beads (CARDIZEM CD) 120 MG Oral Capsule SR 24 Hr, Take 1 capsule (120 mg total) by mout Unknown at Unknown time  Albuterol Sulfate HFA (PROAIR HFA) 108 (90 Base) MCG/ACT Inhalation Aero Soln,  , Disp: , Rfl: , Unknown at Unknown time         HISTORY:     Past Medical History:   Diagnosis Date   • A-fib ([facility]    • Cancer ([facility]    • Diabetes (H cachectic. No distress. HENT:   Head: Normocephalic and atraumatic. Eyes: Pupils are equal, round, and reactive to light. EOM are normal.   Neck: Normal range of motion. No JVD present. No tracheal deviation present.    Cardiovascular: Normal rate and r 08/28/2020     No results found for: MALBP, CREUR, CREAURINE, MIALBURINE, MCRRATIOUR, MALBCRECALC, MICROALBUMIN, CREAUR, MALBCREACALC  Lab Results   Component Value Date    COLORUR Yellow 08/27/2020    CLARITY Cloudy (A) 08/27/2020    SPECGRAVITY 1.017 08/ OTHER:             Moderate amount of upper abdominal and pelvic ascites. Left pleural effusion. Follow-up CT scanning help to further assess the above pathology.    =====  CONCLUSION:   1.  There is diffuse increase in the echogenicity of the liver com

## 2020-08-28 NOTE — PAYOR COMM NOTE
--------------  PLEASE FAX INPT DAYS AUTHORIZED ALONG WITH NRD -[phone]    [facility] YOU    ADMISSION REVIEW     Payor: [address] [place] [address] REGAN/SHIVANI  Subscriber #:  [id number]  Authorization Number: [id number]    Admit date: 8/27/20  Admit time: [facility] 10 mg tablet,  Take 10 mg 1 hr prior to chemo, and then every 6 hours if needed for nausea.    Continuous Blood Gluc Sensor (FREESTYLE BILL 14 DAY SENSOR) Does not apply Misc,  Use daily, changing every 14 days   Continuous Blood Gluc  (FREESTYLE L (Room air)     Current:/80   Pulse 66   Temp 98 °F (36.7 °C) (Oral)   Resp 18   Ht 190.5 cm (6' 3\")   Wt 98.9 kg   SpO2 100%   BMI 27.25 kg/m²      Physical Exam   Constitutional: No distress. HEENT: Dry MM. Head: Normocephalic.    Eyes: No inject for CLOVER in setting of recent lower extremity cellulitis requiring IV antibiotics found to have supratherapeutic vancomycin and progressively worsening renal insufficiency.   Patient with preserved ejection fraction, IV fluids initiated and urine electrolyte rrr,  LUNGS: CTAB, normal resp effort  ABL soft, distended non tender, hypoactive BS  EXT: + LE edema b/l , DP pulses 2+ b/l  Neuro: sensation intact, no focal deficits  SKIN- bl LE erythema  PSYCH- normal mentation, normal affect      LABS:   Lab Results florastor    Abdominal distension  - check KUB to r/o ileus/ obstruction w/ Cdiff  - check abd US to eval for ascites can cs IR for para if needed    Pancreatic ca stage 4  -  stage IV sp 4 c FOLFIRINOX with disease progression now on gem/ abraxane  - sent Max:98.2 °F (36.8 °C)        Intake/Output Summary (Last 24 hours) at 8/28/2020 1123  Last data filed at 8/28/2020 0309      Gross per 24 hour   Intake 500 ml   Output 150 ml   Net 350 ml     He appears cachectic.    Edema (+2 lower extremity edema) present assess. 4. Splenomegaly.  Perisplenic ascites.  Left pleural effusion. 5. Normal bilateral kidneys, aorta and IVC. IMPRESSIONS:   #.  Oliguric CLOVER KDIGO Stage 3  - Due to Vancomycin nephrotoxicity and hypovolemia in the setting of N/V/D.    Stefani Adjutant increased trough and CLOVER     2.  Diarrhea after clindamycin tried as an outpatient  - C.diff positive last admission  - P.o. vancomycin ongoing - will drop to BID suppressive dosing after this hospital stay     3.  Metastatic pancreatic cancer  - Worsening recent admit after failing outpatient keflex and bactrim  - hold vanc - levels supratherapeutic with CLOVER   - d/w ER- will ask for ID on cs as well- for now with elevated levels - just monitor levels   - cont PO vanc for C diff, contact precautions, probiot heparin(PORCINE) 04053jcguy/250mL infusion INITIAL DOSE     Date Action Dose Route User    8/27/2020 2346 New Bag 18 Units/kg/hr × 98.9 kg Intravenous Smita Quiroz, RN      Montelukast Sodium (SINGULAIR) tab 10 mg     Date Action Dose Route User    8/27

## 2020-08-28 NOTE — CONSULTS
Hematology/Oncology Consult Note        NAME: Doris Vera - ROOM: 694B/051-E - MRN: [id number] - Age: 60year old - : 1955    Reason for Consult:  Pancreatic Adenocarcinoma, CLOVER    Patient is a 60 y.o male currently admitted from the [facility] Resp 20   Ht 1.905 m (6' 3\")   Wt 99.4 kg (219 lb 3.2 oz)   SpO2 98%   BMI 27.40 kg/m²   Physical Exam:   General: alert, cooperative, no respiratory distress. Head: Normocephalic, without obvious abnormality, atraumatic.    Throat: Lips, mucosa, and ton

## 2020-08-28 NOTE — PROGRESS NOTES
[facility] Hospitalist Progress Note     PCP: Jacky Bettencourt    Chief Complaint: follow-up    SUBJECTIVE:  Having 4 BMs loose since seen yesterday c/o lower abd pain    OBJECTIVE:  Temp:  [97.7 °F (36.5 °C)-98.2 °F (36.8 °C)] 97.8 °F (36.6 °C)  Pulse:  [62-74] 6 Intravenous Q8H   • pancrelipase (Lip-Prot-Amyl)  40,000 Units Oral TID CC   • Fluticasone Furoate-Vilanterol  1 puff Inhalation Daily   • Montelukast Sodium  10 mg Oral Nightly   • Pantoprazole Sodium  40 mg Oral QAM AC   • Acidophilus/Pectin  1 capsule O ask for ID on cs as well- for now with elevated levels - just monitor levels   - cont PO vanc for C diff, contact precautions, probiotic not florastor     Abdominal distension  - check KUB to r/o ileus/ obstruction w/ Cdiff- showing fluid filled sm bowel l

## 2020-08-29 NOTE — PLAN OF CARE
Problem: Patient Centered Care  Goal: Patient preferences are identified and integrated in the patient's plan of care  Description  Interventions:  - What would you like us to know as we care for you?  I keep track of my medications and like to be informe

## 2020-08-29 NOTE — PLAN OF CARE
Pt A&Ox3, forgetful at times but compliant with care, afebrile, abdomen distended, c/o pain 4/10, medicated as per MD order, see MAR, plan for paracentesis today, NPO x/meds at present.     Problem: Patient Centered Care  Goal: Patient preferences are ident

## 2020-08-29 NOTE — PROGRESS NOTES
[facility]  Progress Note    Sina Cava Patient Status:  Inpatient    1955 MRN [id number]   Location [facility] 4W/SW/SE Attending Levy Camarillo MD   [facility] Day # 2 PCP Marcos Jacobs     Subjective:    Feels well    Objective: (H) 11.0 - 15.0 %    .0 150.0 - 450.0 10(3)uL    Neutrophil Absolute Prelim 2.08 1.50 - 7.70 x10 (3) uL    Neutrophil Absolute 2.08 1.50 - 7.70 x10(3) uL    Lymphocyte Absolute 0.30 (L) 1.00 - 4.00 x10(3) uL    Monocyte Absolute 0.47 0.10 - 1.00 x10 hernia. 6. Small right and moderate left side pleural effusion. Bibasilar atelectasis and/or superimposed left lower lobe consolidation.     Dictated by (CST): Andres Carrington MD on 8/28/2020 at 6:42 PM     Finalized by (CST): Andres Carrington MD on

## 2020-08-29 NOTE — PROGRESS NOTES
NEPHROLOGY DAILY PROGRESS NOTE       Follow up Reason: CLOVER     SUBJECTIVE:  Making more urine today   Has abd discomfort, disappointed he cannot get paracentesis today      OBJECTIVE:    Total Intake/Output:    Intake/Output Summary (Last 24 hours) at 8/29 injection 3 mL, 3 mL, Intravenous, PRN  heparin (PORCINE) drip 10095kofwn/250mL infusion CONTINUOUS, 200-3,000 Units/hr, Intravenous, Continuous  acetaminophen (TYLENOL) tab 650 mg, 650 mg, Oral, Q6H PRN  traZODone HCl (DESYREL) tab 50 mg, 50 mg, Oral, Nig Pain.  Continuous Blood Gluc Sensor (TalendSTYLE BILL 14 DAY SENSOR) Does not apply Misc, Use daily, changing every 14 days  Continuous Blood Gluc  (FREESTYLE BILL READER) Does not apply Device, USE AD DIRECTED  traMADol HCl 50 MG Oral Tab, Take 1 Pancreatic head mass consistent patient's known pancreatic carcinoma. Extrahepatic biliary stent traversing the common duct with superimposed pneumobilia. 2. Diffuse nonspecific pancolitis.   Differential would include infectious etiologies, inflammatory b Pancreatic adenocarcinoma  - Most recently on gemcitabine and abraxane     #. Ascites and lower extremity edema     #. Hypoalbuminemia     RECOMMENDATIONS:   - No indication for dialysis presently  - Is off of IV Vanc  - continue IV albumin x 48 hours.   -

## 2020-08-29 NOTE — PROGRESS NOTES
[facility] Infectious Disease Progress Note    Claudell Counts Patient Status:  Inpatient    1955 MRN [id number]   Location [facility] 4W/SW/SE Attending Elli Diane MD   Hosp Day # 2 PCP Chris Gar     Subjective:  Pa mg, 5 mg, Intravenous, Q8H PRN  •  PEG 3350 (MIRALAX) powder packet 17 g, 17 g, Oral, Daily PRN  •  bisacodyl (DULCOLAX) rectal suppository 10 mg, 10 mg, Rectal, Daily PRN    Physical Exam:  General: Alert, orientated x3. Cooperative.   No apparent distres vancomycin was completing as an outpatient - now held for increased trough and CLOVER  - Last dose on 8/26. Likely still in the system,   - Legs are improving,      2.  Diarrhea after clindamycin tried as an outpatient  - C.diff positive last admission  - P. o

## 2020-08-29 NOTE — PROGRESS NOTES
[facility] Hospitalist Progress Note     PCP: Bao Hernandez    Chief Complaint: follow-up    SUBJECTIVE:  Inc abd distension, son in law at bedside - diarrhea/ nausea improved no vomiting. Discussed CT results    OBJECTIVE:  Temp:  [98 °F (36.7 °C)-98.5 °F (36. 125 mg Oral TID   • dilTIAZem HCl ER Coated Beads  120 mg Oral Daily   • sodium bicarbonate  1,300 mg Oral TID   • Albumin Human  25 g Intravenous Q8H   • clotrimazole-betamethasone   Topical BID   • pancrelipase (Lip-Prot-Amyl)  40,000 Units Oral TID CC Dick will reduce oral vanc dosing as well     Recent admit for LLE cellulitis and C diff colitis  - cellulitis recent admit after failing outpatient keflex and bactrim  - hold vanc - levels supratherapeutic with CLOVER   - d/w ER- will ask for ID on cs as we

## 2020-08-30 NOTE — PLAN OF CARE
Patient A/Ox4. VSS. On room air. Heparin gtt maintained at 13gtt/hour. Redraw scheduled for tomorrow. Voiding freely, diminished urine output. Pain managed with Tramadol PRN. Patient has very minimal appetite. Abd firm and distended.  Plan for paracentesis (meds, wound care, etc)  - Arrange for interpreters to assist at discharge as needed  - Consider post-discharge preferences of patient/family/discharge partner  - Complete POLST form as appropriate  - Assess patient's ability to be responsible for managing

## 2020-08-30 NOTE — PROGRESS NOTES
[facility] Infectious Disease Progress Note    Mick Brunson Patient Status:  Inpatient    1955 MRN [id number]   Location [facility] 4W/SW/SE Attending Irma Cheatham MD   Hosp Day # 3 PCP Raji Howard     Subjective:  Pa Q6H PRN  •  Metoclopramide HCl (REGLAN) injection 5 mg, 5 mg, Intravenous, Q8H PRN  •  PEG 3350 (MIRALAX) powder packet 17 g, 17 g, Oral, Daily PRN  •  bisacodyl (DULCOLAX) rectal suppository 10 mg, 10 mg, Rectal, Daily PRN    Physical Exam:  General: Aler bactrim PTA  - Dopplers without clotting  - IV vancomycin was completing as an outpatient - now held for increased trough and CLOVER  - Last dose on 8/26.  Likely still in the system,   - Legs are improving,      2.  Diarrhea after clindamycin tried as an outp

## 2020-08-30 NOTE — PROGRESS NOTES
[facility] Hospitalist Progress Note     PCP: Bao Hernandez    Chief Complaint: follow-up    SUBJECTIVE:  Unchanged abd distension and associated discomfort from it.  Cr higher today    OBJECTIVE:  Temp:  [98 °F (36.7 °C)-98.6 °F (37 °C)] 98.4 °F (36.9 °C)  Puls 08/28/20  0401   PGLU 77       No results for input(s): TROP in the last 168 hours.       Meds:     • vancomycin HCl  125 mg Oral TID   • dilTIAZem HCl ER Coated Beads  120 mg Oral Daily   • sodium bicarbonate  1,300 mg Oral TID   • clotrimazole-betamethaso swelling /abd ascites) will switch to albumin   - oral vanc dosing reduced by ID as  well     Recent admit for LLE cellulitis and C diff colitis  - cellulitis recent admit after failing outpatient keflex and bactrim  - hold vanc - levels supratherapeutic w

## 2020-08-30 NOTE — PROGRESS NOTES
NEPHROLOGY DAILY PROGRESS NOTE       Follow up Reason: CLOVER     SUBJECTIVE:  Appetite is decreased,  Continues to have abd discomfort.  Looking forward to paracentesis      OBJECTIVE:    Total Intake/Output:    Intake/Output Summary (Last 24 hours) at 8/30/2 21555xyqpe/250mL infusion CONTINUOUS, 200-3,000 Units/hr, Intravenous, Continuous  acetaminophen (TYLENOL) tab 650 mg, 650 mg, Oral, Q6H PRN  traZODone HCl (DESYREL) tab 50 mg, 50 mg, Oral, Nightly PRN  ondansetron HCl (ZOFRAN) injection 4 mg, 4 mg, Luiz Councilman SENSOR) Does not apply Misc, Use daily, changing every 14 days  Continuous Blood Gluc  (FREESTYLE BILL READER) Does not apply Device, USE AD DIRECTED  traMADol HCl 50 MG Oral Tab, Take 1 tablet (50 mg total) by mouth every 4 to 6 hours as needed f 8/28/2020 at [facility] by (CST): Eleuterio Nguyen MD on 8/28/2020 at 6:53 PM                 IMPRESSIONS:   #.  Oliguric CLOVER KDIGO Stage 3  - Due to Vancomycin nephrotoxicity and hypovolemia in the setting of N/V/D.    - Baseline creatinine 0

## 2020-08-30 NOTE — CM/SW NOTE
SW notified Livia Fee from [facility] of pt's discharge. Per chart review, pt will dc home on oral abx. Updated Silvino Cadet from [place].     [address]

## 2020-08-30 NOTE — PLAN OF CARE
Patient is currently resting. Alert & oriented x4. Room air. CPAP at night. Tele in place, no calls received. VSS, afebrile. Heparin gtt running at 13ml/hr. Next PTT draw is 0500. Left port. Scheduled albumin. Tramadol prn for pain.  Tolerating general diet non-pharmacological measures as appropriate and evaluate response  - Consider cultural and social influences on pain and pain management  - Manage/alleviate anxiety  - Utilize distraction and/or relaxation techniques  - Monitor for opioid side effects  - N Progressing

## 2020-08-31 NOTE — PLAN OF CARE
Pt A&Ox3, forgetful at times, re-orientates well, VSS, afebrile, NPO at present, awaiting paracentesis, abdomen grossly distended, no audible bowel sounds, +flatus, last reported b/m 8/28, c/o abdominal pain 5/10, medicated as per MD, see MAR.     Problem: opioid side effects  - Notify MD/LIP if interventions unsuccessful or patient reports new pain  - Anticipate increased pain with activity and pre-medicate as appropriate  Outcome: Progressing     Problem: SAFETY ADULT - FALL  Goal: Free from fall injury  D Administer ordered medications to maintain glucose within target range  - Assess barriers to adequate nutritional intake and initiate nutrition consult as needed  - Instruct patient on self management of diabetes  Outcome: Progressing

## 2020-08-31 NOTE — PAYOR COMM NOTE
--------------  PLEASE FAX INPT DAYS AUTHORIZED ALONG WITH NRD -[phone]    [facility] YOU    8/29 -31 CONTINUED STAY REVIEW    Payor: Jessica SPEARS/SHIVANI  Subscriber #:  [id number]  Authorization Number: [id number]    HOSPITALIST:    Inc abd distension, s • dilTIAZem HCl ER Coated Beads  120 mg Oral Daily   • sodium bicarbonate  1,300 mg Oral TID   • Albumin Human  25 g Intravenous Q8H   • clotrimazole-betamethasone   Topical BID   • pancrelipase (Lip-Prot-Amyl)  40,000 Units Oral TID CC   • Fluticasone Fur - Cr remains up today d/w Dr Espinoza Pro will reduce oral vanc dosing as well     Recent admit for LLE cellulitis and C diff colitis  - cellulitis recent admit after failing outpatient keflex and bactrim  - hold vanc - levels supratherapeutic with CLOVER   - d/w ER - Due to Vancomycin nephrotoxicity and hypovolemia in the setting of N/V/D.    - Baseline creatinine 0.77 as of 8/20/2020  - Creatinine peak 4.20 as of 8/27/2020, remains elevated at 4.2 today      #.  Normal Anion Gap Metabolic Acidosis  - Due to GI losses - Due to Vancomycin nephrotoxicity and hypovolemia in the setting of N/V/D.    - Baseline creatinine 0.77 as of 8/20/2020  - complete 48 hours of albumin. Creatinine worsening to 4.46.       #.  Normal Anion Gap Metabolic Acidosis  - Due to GI losses from CREATSERUM 2.27*   < > 3.76* 4.20* 4.04* 4.20* 4.46*   CA 8.4*  --   --  8.4* 8.3* 8.2* 8.3*   GLU 59*  --  137* 110* 79 82 94     Meds:      • vancomycin HCl  125 mg Oral TID   • dilTIAZem HCl ER Coated Beads  120 mg Oral Daily   • sodium bicarbonate  1,3 - also US shows GB distension + sludge no discrete stone or obstruction- no RUQ TTP     Pancreatic ca stage 4  - stage IV sp 4 c FOLFIRINOX with disease progression now on gem/ abraxane  - onc on cs     Portal vein thrombus  - occurred on eliquis  - on lov DISPO:  Continue to monitor off antibiotics, will reassess if needs any further oral antibiotics. Recheck vanco trough. Continue PO Vanco prophylaxis for now. NEPHROLOGY DAILY PROGRESS NOTE         Follow up Reason:  CLOVER      SUBJECTIVE:  Breathing RECOMMENDATIONS:      -  No acute indication for dialysis presently. Patient states he would do dialysis if he had to. - Follow renal fxn and I/Os. - Encourage oral intake. - Avoid nephrotoxins such as NSAIDs, IV contrast, fleet enema.   - Renally dose - supsect multifactorial - pre-renal fm dec PO, poss med related (vanc- supratherapeutic level today and on 8/24/20- so could be med toxicity but also levels could be up d/t impaired clearance)  - avoid nephrotoxics, follow BMP, ins/outs, daily weights  - Christine 65- Full code d/w pt and daughter at bedside on admit, will need to consider further d/w this await onc eval in house       PARACENTESIS: total amount drained 4080 ml.         HEME/ONC:  Feels okay, renal function worsening     Objective:  Blood pressure 1

## 2020-08-31 NOTE — PROGRESS NOTES
NEPHROLOGY DAILY PROGRESS NOTE       Follow up Reason: CLOVER     SUBJECTIVE:  Breathing is shallow   Has abdominal discomfort, to go for paracentesis later today     OBJECTIVE:    Total Intake/Output:    Intake/Output Summary (Last 24 hours) at 8/31/2020 130 62062romqa/250mL infusion CONTINUOUS, 200-3,000 Units/hr, Intravenous, Continuous  acetaminophen (TYLENOL) tab 650 mg, 650 mg, Oral, Q6H PRN  traZODone HCl (DESYREL) tab 50 mg, 50 mg, Oral, Nightly PRN  ondansetron HCl (ZOFRAN) injection 4 mg, 4 mg, Emelina James SENSOR) Does not apply Misc, Use daily, changing every 14 days  Continuous Blood Gluc  (FREESTYLE BILL READER) Does not apply Device, USE AD DIRECTED  traMADol HCl 50 MG Oral Tab, Take 1 tablet (50 mg total) by mouth every 4 to 6 hours as needed f indication for dialysis presently. Patient states he would do dialysis if he had to. - Follow renal fxn and I/Os. - Encourage oral intake. - Avoid nephrotoxins such as NSAIDs, IV contrast, fleet enema. - Renally dose all medications.   - Strict intake

## 2020-08-31 NOTE — PROGRESS NOTES
[facility]  Progress Note    Ciara Medrano Patient Status:  Inpatient    1955 MRN [id number]   Location [facility] 4W/SW/SE Attending Nasim Linton MD   Hosp Day # 4 PCP Jacky Bettencourt     Subjective:    Feels okay, renal functio 4.9 mg/dL   BASIC METABOLIC PANEL (8)    Collection Time: 08/31/20 10:17 AM   Result Value Ref Range    Glucose 94 70 - 99 mg/dL    Sodium 136 136 - 145 mmol/L    Potassium 4.1 3.5 - 5.1 mmol/L    Chloride 108 98 - 112 mmol/L    CO2 18.0 (L) 21.0 - 32.0 mm metastatic pancreatic ca with cellulitis and CLOVER     Metastatic Adenocarcinoma  - progressive on folofirinox  - on second line with gem/abraxane--> treatment on hold due to infection and worsening CLOVER   - no treatment while inpatient   - check ca 19-9

## 2020-08-31 NOTE — PROGRESS NOTES
[facility] Hospitalist Progress Note     PCP: Sarath Brown    Chief Complaint: follow-up    SUBJECTIVE:  Labs pending today. No BM x 3 days still with dec appetite.  Unchanged distension > para today    OBJECTIVE:  Temp:  [98.2 °F (36.8 °C)-99.2 °F (37.3 °C)] 9 hours.      Meds:     • vancomycin HCl  125 mg Oral TID   • dilTIAZem HCl ER Coated Beads  120 mg Oral Daily   • sodium bicarbonate  1,300 mg Oral TID   • clotrimazole-betamethasone   Topical BID   • pancrelipase (Lip-Prot-Amyl)  40,000 Units Oral TID CC Well  - BMP pending for today- done and stable     Recent admit for LLE cellulitis and C diff colitis  - cellulitis recent admit after failing outpatient keflex and bactrim  - hold vanc - levels supratherapeutic with CLOVER   - d/w ER- will ask for ID on cs a

## 2020-08-31 NOTE — PROGRESS NOTES
[facility] AND [facility] Infectious Disease Progress Note    Naomi Jones Patient Status:  Inpatient    1955 MRN [id number]   Location [facility] 4W/SW/SE Attending Yue Morgan MD   Hosp Day # 4 PCP Sarath Brown     Subjective:  Ch 650 mg, Oral, Q6H PRN  •  traZODone HCl (DESYREL) tab 50 mg, 50 mg, Oral, Nightly PRN  •  ondansetron HCl (ZOFRAN) injection 4 mg, 4 mg, Intravenous, Q6H PRN  •  Metoclopramide HCl (REGLAN) injection 5 mg, 5 mg, Intravenous, Q8H PRN  •  PEG 3350 (MIRALAX) 92.0 08/31/2020    INR 1.37 08/31/2020    PHOS 4.8 08/31/2020         Assessment/Plan:    1.   BLE cellulitis  -with erythema superimposed on chronic edema  -pain, edema and mottling of skin on last admission, now exfoliating  -failed PO Keflex and Bactrim

## 2020-08-31 NOTE — IMAGING NOTE
1355 Pt to ultrasound room scouts taken by [address] RT    Hx taken procedure explained questions answered- SEE Epic HISTORY    HR 72 /68 PO2 SAT 96%     1355 Patient consented.     Pt to get albumin 25% 25 grams yes or no     COLE CHARLES  Here sc

## 2020-08-31 NOTE — PLAN OF CARE
Patient is currently resting. Alert & oriented x4. Room air, uses CPAP at night. Tele in place, no calls received. Saline locked through left port. Heparin gtt running at 13 ml/hr. Next PTT is this AM at 0500. Pain being managed with tramadol prn.  Voiding appropriate and evaluate response  - Consider cultural and social influences on pain and pain management  - Manage/alleviate anxiety  - Utilize distraction and/or relaxation techniques  - Monitor for opioid side effects  - Notify MD/LIP if interventions un Control  Goal: Glucose maintained within prescribed range  Description  INTERVENTIONS:  - Monitor Blood Glucose as ordered  - Assess for signs and symptoms of hyperglycemia and hypoglycemia  - Administer ordered medications to maintain glucose within targe

## 2020-09-01 PROBLEM — Z71.89 GOALS OF CARE, COUNSELING/DISCUSSION: Status: ACTIVE | Noted: 2020-01-01

## 2020-09-01 PROBLEM — Z71.89 ADVANCE CARE PLANNING: Status: ACTIVE | Noted: 2020-01-01

## 2020-09-01 NOTE — CM/SW NOTE
[facility]. The pt. Remains off IV abx at this time. SW/CM will continue to follow and assist with discharge planning needs. Resume [facility] orders have been entered.       Jarrett Hall, [facility] ext [phone]

## 2020-09-01 NOTE — PLAN OF CARE
Pt A&Ox3, forgetful at times, re-orientates well, VSS, afebrile, po intake very poor, taking sips of gatorade from home, abdomen remains grossly distended, no audible bowel sounds, +flatus, last reported b/m 8/28, c/o abdominal pain 5/10, medicated as per and/or relaxation techniques  - Monitor for opioid side effects  - Notify MD/LIP if interventions unsuccessful or patient reports new pain  - Anticipate increased pain with activity and pre-medicate as appropriate  Outcome: Progressing     Problem: SAFETY hyperglycemia and hypoglycemia  - Administer ordered medications to maintain glucose within target range  - Assess barriers to adequate nutritional intake and initiate nutrition consult as needed  - Instruct patient on self management of diabetes  Outcome:

## 2020-09-01 NOTE — PROGRESS NOTES
NEPHROLOGY DAILY PROGRESS NOTE       Follow up Reason: CLOVER     SUBJECTIVE:  S/p paracentesis yesterday 4L removed.  Received albumin afterwards   Breathing is better today after paracentesis       OBJECTIVE:    Total Intake/Output:    Intake/Output Summary PRN  heparin (PORCINE) drip 75057xohbt/250mL infusion CONTINUOUS, 200-3,000 Units/hr, Intravenous, Continuous  acetaminophen (TYLENOL) tab 650 mg, 650 mg, Oral, Q6H PRN  traZODone HCl (DESYREL) tab 50 mg, 50 mg, Oral, Nightly PRN  ondansetron HCl (ZOFRAN) Gluc Sensor (PHARMAJETYLE BILL 14 DAY SENSOR) Does not apply Misc, Use daily, changing every 14 days  Continuous Blood Gluc  (FREESTYLE BILL READER) Does not apply Device, USE AD DIRECTED  traMADol HCl 50 MG Oral Tab, Take 1 tablet (50 mg total) by dialysis anyway.      #. Normal Anion Gap Metabolic Acidosis  - Due to GI losses from diarrhea, also with CLOVER   - continue to monitor       #. Mild Hyponatremia  - improved       #.  Pancreatic adenocarcinoma  - Most recently on gemcitabine and abraxane  -

## 2020-09-01 NOTE — PROGRESS NOTES
[facility] AND [facility] Infectious Disease Progress Note    Ciara Medrano Patient Status:  Inpatient    1955 MRN [id number]   Location [facility] 4W/SW/SE Attending Nasim Linton MD   Hosp Day # 5 PCP Jacky Bettencourt     Subjective:  Pa traZODone HCl (DESYREL) tab 50 mg, 50 mg, Oral, Nightly PRN  •  ondansetron HCl (ZOFRAN) injection 4 mg, 4 mg, Intravenous, Q6H PRN  •  Metoclopramide HCl (REGLAN) injection 5 mg, 5 mg, Intravenous, Q8H PRN  •  PEG 3350 (MIRALAX) powder packet 17 g, 17 g, with erythema  on chronic edema  - LLE cellulitis with pain, edema, mottling of skin, now exfolliating  - Failed p.o. cephalexin, bactrim PTA  - Dopplers without clotting  - IV vancomycin was completing as an OP - now held for CLOVER   - Last dose on 8/26,

## 2020-09-01 NOTE — PROGRESS NOTES
[facility] Hospitalist Progress Note     PCP: Corbin Donald    Chief Complaint: follow-up    SUBJECTIVE:  4.080 L out fm para yesterday pt reports that his discomfort from distension better, no BM but improved appetite.  Discussed with him with daughter at chair 38* 39* 37* 37*  37* 39*   CREATSERUM 4.04* 4.20* 4.46* 4.57*  4.57* 4.48*   CA 8.3* 8.2* 8.3* 8.4*  8.4* 8.1*   PHOS  --   --   --  4.8 4.9   GLU 79 82 94 94  94 83       Recent Labs   Lab 08/27/20  1432 08/31/20  1017 09/01/20  0619   ALT 21  --   -- poss med related (vanc- supratherapeutic level today and on 8/24/20- so could be med toxicity but also levels could be up d/t impaired clearance)  - avoid nephrotoxics, follow BMP, ins/outs, daily weights  - urine Na < 5- prenal v hepatorenal d/w Dr Light Poster path to add markers to cytology  - see Christine 64 below     Portal vein thrombus  - occurred on eliquis  - on lovenox PTA- I switched to heparin gtt d/t impaired CrCl- continue  - HOLD on 9/3 for 4 hours before repeat para- nursing miscellaneous order in    afib

## 2020-09-01 NOTE — PROGRESS NOTES
[facility]  Progress Note    Mallika Pedraza Patient Status:  Inpatient    1955 MRN [id number]   Location [facility] 4W/SW/SE Attending Linda Fisher MD   [facility] Day # 5 PCP Lexy Hoskins     Subjective:    S/p paracentesis yesterda (L) 8.5 - 10.1 mg/dL    Calculated Osmolality 295 275 - 295 mOsm/kg    GFR, Non- 13 (L) >=60    GFR, -American 15 (L) >=60    Albumin 2.3 (L) 3.4 - 5.0 g/dL    Phosphorus 4.9 2.5 - 4.9 mg/dL   CBC, PLATELET; NO DIFFERENTIAL    Collec 1 puff Inhalation Daily   • Montelukast Sodium  10 mg Oral Nightly   • Pantoprazole Sodium  40 mg Oral QAM AC   • Acidophilus/Pectin  1 capsule Oral Daily         ASSESSMENT AND PLAN:     Naomi Jones is a 60 y.o male with metastatic pancreatic ca w

## 2020-09-01 NOTE — CONSULTS
[facility] Patient Status:  Inpatient    1955 MRN [id number]   Location [facility] 4W/SW/SE Attending Rogerio Turpin MD   Hosp Day # 5 PCP Noe Stewart     Date of Con Laterality Date   • INSERTION PORT-A-CATHETER N/A 5/27/2020    Performed by Jayden Yoo MD at [address] Social History:   Marital Status:   Children: 3 dtrs, Saeed Reich (HCPOA), [employer]  Living Situation Pr Intravenous, Continuous  •  acetaminophen (TYLENOL) tab 650 mg, 650 mg, Oral, Q6H PRN  •  traZODone HCl (DESYREL) tab 50 mg, 50 mg, Oral, Nightly PRN  •  ondansetron HCl (ZOFRAN) injection 4 mg, 4 mg, Intravenous, Q6H PRN  •  Metoclopramide HCl (REGLAN) in 09/01/2020    ALKPHO 289 (H) 08/27/2020    BILT 0.50 08/27/2020    TP 6.2 (L) 08/27/2020    AST 23 08/27/2020    ALT 21 08/27/2020    PHOS 4.9 09/01/2020     Admission labs: Glucose 110, Na 133, K 4.6, C02 16.0, BUn 36 Cr 4.20, Ca 8.6,, WBC 4.9, Hgb 9.0, H throughout the abdomen which may suggest fluid-filled loops of small bowel or possibly ascites. Correlate with CT scanning of the abdomen and pelvis.        Objective:  Vital Signs:  Blood pressure 131/73, pulse 67, temperature 98 °F (36.7 °C), temperature Patient/family declined Spiritual Care    Disposition: ongoing goals of care discussions    Bygget 64 discussion with patient and dtr (Ellen): I discussed reason for palliative care consultation with patient and dtr Ellen.  I provided education on the differen healthcare decisions. I addressed his full code status and discussed the risks vs benefits of life sustaining treatments in the setting of advanced age and current clinical picture.  I recommended consideration for DNAR/DNI/NO HD in his clinical picture and planning  -see above for details  -Pt's dtr Lea Bennett is [facility] #[phone]  -Education provided on importance of early ACP discussions, provided blank POLST form for pt/family review.      Palliative Performance Scale 40%    Emotion support provided to tamera

## 2020-09-01 NOTE — PLAN OF CARE
Heparin drip infusing. PTT therapeutic @2330, no change in rate. Voiding. No bowel movement. Pain at paracentesis site, medicated with tramadol PRN. Offered ice pack, patient declined. CPAP overnight.  Up with walker and standby assist. Poor appetite, drink management  - Manage/alleviate anxiety  - Utilize distraction and/or relaxation techniques  - Monitor for opioid side effects  - Notify MD/LIP if interventions unsuccessful or patient reports new pain  - Anticipate increased pain with activity and pre-medi Monitor Blood Glucose as ordered  - Assess for signs and symptoms of hyperglycemia and hypoglycemia  - Administer ordered medications to maintain glucose within target range  - Assess barriers to adequate nutritional intake and initiate nutrition consult a

## 2020-09-01 NOTE — PAYOR COMM NOTE
--------------  9/1 CONTINUED STAY REVIEW    Payor: [address] [place] [address] #:  [id number]  Authorization Number: [id number]      HOSPITALIST:  SUBJECTIVE:  4.080 L out fm para yesterday pt reports that his discomfort from distension better, no BM b 4.1 4.0    109 107 108  108 109   CO2 16.0* 16.0* 18.0* 18.0*  18.0* 19.0*   BUN 38* 39* 37* 37*  37* 39*   CREATSERUM 4.04* 4.20* 4.46* 4.57*  4.57* 4.48*   CA 8.3* 8.2* 8.3* 8.4*  8.4* 8.1*   PHOS  --   --   --  4.8 4.9   GLU 79 82 94 94  94 83     supratherapeutic level today and on 8/24/20- so could be med toxicity but also levels could be up d/t impaired clearance)  - avoid nephrotoxics, follow BMP, ins/outs, daily weights  - urine Na < 5- prenal v hepatorenal d/w Dr Jolie Mishra on 8/28 suspect mixed pi cytology  - see Bygget 64 below      Portal vein thrombus  - occurred on eliquis  - on lovenox PTA- I switched to heparin gtt d/t impaired CrCl- continue  - HOLD on 9/3 for 4 hours before repeat para- nursing miscellaneous order in     afib  - AC as noted above CLOVER     Cellulitis  - per ID      CLOVER  - appreciate renal recs   - pre-renal vs hepatorenal vs vanc related, management per nephrology , patient willing to go through HD if needed      Anemia  - secondary to chemo  - keep hgb > 7     GOC: await palliative renal failure and provided education the risks/benefits of HD. Pt tells me he would pursue HD if needed and I clarified that HD is not \"just one session\" as he thought.  I recommended he have extensive discussion regarding HD with nephrology as there are thrombosis  -remains on heparin gtt per MDs     Recent Cellulitis of lower extremity, unspecified laterality  -resolved     Recent C-diff     Afib     DM     GERD  LONDON     Malnutrition/Wt loss/poor po intake  -r/t malignancy, per dietician recs     Goals o 8/31/2020 1528 Given (none) Topical Salty Georges, RN      heparin (PORCINE) drip 13549kjnrg/250mL infusion CONTINUOUS     Date Action Dose Route User    8/31/2020 2031 New Bag 1300 Units/hr Intravenous Benny Simmons, [address] [place] [address]    8/31/2020 6207

## 2020-09-02 NOTE — PALLIATIVE CARE NOTE
[facility]  Palliative Care Follow Up    Cindi Chu Patient Status:  Inpatient    1955 MRN [id number]   Location [facility] 4W/SW/SE Attending Lucero Phillips MD   Hosp Day # 6 PCP Morena Schultz     Date of Consult: 120 mg, 120 mg, Oral, Daily  •  Dicyclomine HCl (BENTYL) cap 10 mg, 10 mg, Oral, TID PRN  •  traMADol HCl (ULTRAM) tab 50 mg, 50 mg, Oral, Q4H PRN  •  sodium bicarbonate tab 1,300 mg, 1,300 mg, Oral, TID  •  clotrimazole-betamethasone (LOTRISONE) cream, , Chemistry:  Lab Results   Component Value Date    CREATSERUM 4.37 (H) 09/02/2020    BUN 39 (H) 09/02/2020     09/02/2020    K 4.0 09/02/2020     09/02/2020    CO2 20.0 (L) 09/02/2020    GLU 97 09/02/2020    CA 8.2 (L) 09/02/2020    ALB 2. to patient and daughters  Code status: FULL CODE  Have advanced directives been discussed with patient or healthcare power of : Yes                         Spiritual needs addressed: Patient/family declined Spiritual Care    Disposition: ongoing go all of the following:direct face to face contact, history taking, physical examination, and >50% was spent counseling and coordinating care. Discussed today's visit with [facility] RN. I will continue to follow clinically.     Daily Al, ANP-BC, ACHPN

## 2020-09-02 NOTE — PLAN OF CARE
Problem: Patient Centered Care  Goal: Patient preferences are identified and integrated in the patient's plan of care  Description  Interventions:  - What would you like us to know as we care for you?  I have cancer.  - Provide timely, complete, and accur Free from fall injury  Description  INTERVENTIONS:  - Assess pt frequently for physical needs  - Identify cognitive and physical deficits and behaviors that affect risk of falls.   - [place] fall precautions as indicated by assessment.  - Educate pt/famil Patient sleeping comfortably in bed. Tramadol for pain control. Call light within reach. Bed low and locked. Heparin gtt for DVT ppx. Tolerating diet; NPO maintained after midnight. Ambulating with SBA and walker. Plan for possible paracentesis today.

## 2020-09-02 NOTE — PROGRESS NOTES
[facility]  Progress Note    Jad Phillips Patient Status:  Inpatient    1955 MRN [id number]   Location [facility] 4W/SW/SE Attending Sujata Carcamo MD   Hosp Day # 6 PCP Nish Nj     Subjective:    Chart reviewed   Renal fu --    < > 13*  13* 13* 13*   CA  --    < > 8.4*  8.4* 8.1* 8.2*   ALB 2.6*  --  2.4* 2.3* 2.3*   *   < > 136  136 138 139   K 5.33*   < > 4.1  4.1 4.0 4.0      < > 108  108 109 108   CO2 15.9*   < > 18.0*  18.0* 19.0* 20.0*   ALKPHO 289*  -- 6 hrs before     Cellulitis  - per ID     CLOVER  - appreciate renal recs   - pre-renal vs hepatorenal vs vanc related, management per nephrology , patient willing to go through HD if needed   - renal function improving slowly     Anemia  - secondary to chemo

## 2020-09-02 NOTE — PROGRESS NOTES
NEPHROLOGY DAILY PROGRESS NOTE       Follow up Reason: CLOVER     SUBJECTIVE:  Feels better today, has more energy   Appetite is decreased       OBJECTIVE:    Total Intake/Output:    Intake/Output Summary (Last 24 hours) at 9/2/2020 1101  Last data filed at 9 CONTINUOUS, 200-3,000 Units/hr, Intravenous, Continuous  acetaminophen (TYLENOL) tab 650 mg, 650 mg, Oral, Q6H PRN  traZODone HCl (DESYREL) tab 50 mg, 50 mg, Oral, Nightly PRN  ondansetron HCl (ZOFRAN) injection 4 mg, 4 mg, Intravenous, Q6H PRN  Metoclopra apply Misc, Use daily, changing every 14 days  Continuous Blood Gluc  (FREESTYLE BILL READER) Does not apply Device, USE AD DIRECTED  traMADol HCl 50 MG Oral Tab, Take 1 tablet (50 mg total) by mouth every 4 to 6 hours as needed for Pain.   Insulin a good candidate for dialysis anyway.      #. Normal Anion Gap Metabolic Acidosis  - Due to GI losses from diarrhea, also with CLOVER   - continue to monitor       #. Mild Hyponatremia  - improved       #.  Pancreatic adenocarcinoma  - Most recently on gemcita

## 2020-09-02 NOTE — PLAN OF CARE
Pt A/O x4. Tolerating diet. Hep gtt @ 13. Isolation precautions maintained. Plan for paracentesis tomorrow. Updated pt & daughter on care of plan. Ambulating with assist. Tramadol PRN for pain. Fall precautions in place. Call light within reach.  Calls appr Manage/alleviate anxiety  - Utilize distraction and/or relaxation techniques  - Monitor for opioid side effects  - Notify MD/LIP if interventions unsuccessful or patient reports new pain  - Anticipate increased pain with activity and pre-medicate as approp Glucose as ordered  - Assess for signs and symptoms of hyperglycemia and hypoglycemia  - Administer ordered medications to maintain glucose within target range  - Assess barriers to adequate nutritional intake and initiate nutrition consult as needed  - In

## 2020-09-02 NOTE — PROGRESS NOTES
[facility] Hospitalist Progress Note     PCP: Lexy Hoskins    Chief Complaint: follow-up    SUBJECTIVE:  4.37 today,250 cc recorded UOP.  Denies any focal sx     OBJECTIVE:  Temp:  [98.2 °F (36.8 °C)-98.7 °F (37.1 °C)] 98.2 °F (36.8 °C)  Pulse:  [71-73] 73  Res 09/02/20  0702   ALT 21  --   --   --    AST 23  --   --   --    ALB 2.6* 2.4* 2.3* 2.3*       Recent Labs   Lab 08/28/20  0401 08/31/20  2200   PGLU 77 106*       No results for input(s): TROP in the last 168 hours.       Meds:     • vancomycin HCl  125 mg intiial urine Na < 5- prenal v hepatorenal d/w Dr Angela Ahmadi on 8/28 suspect mixed picture etiology for CLOVER- with third spacing of fluids (LE swelling /abd ascites) given albumin post para as well  - repeat urine studies now more c/w ATN  - BMP Cr 4.37, slight follow     DM  - not on SSI last admit can monitor gluc on BMP and start accuchecks if up     Cancer related pain  - follows with [facility] palliative  - cont home regimen   - pt ok with continuing tramadol for today      GERD  - PPI      LONDON  - CPAP qhs     GOC

## 2020-09-02 NOTE — PROGRESS NOTES
[facility] AND [facility] Infectious Disease Progress Note    Patrica Chávez Patient Status:  Inpatient    1955 MRN [id number]   Location [facility] 4W/SW/SE Attending Roberot Samayoa MD   Hosp Day # 6 PCP Jen Hernandez     Subjective:  Pa ondansetron HCl (ZOFRAN) injection 4 mg, 4 mg, Intravenous, Q6H PRN  •  Metoclopramide HCl (REGLAN) injection 5 mg, 5 mg, Intravenous, Q8H PRN  •  PEG 3350 (MIRALAX) powder packet 17 g, 17 g, Oral, Daily PRN  •  bisacodyl (DULCOLAX) rectal suppository 10 m cellulitis with pain, edema, mottling of skin, now exfolliating  - Failed p.o. cephalexin, bactrim PTA  - Dopplers without clotting  - IV vancomycin was completing as an OP - now held for CLOVER   - Last dose on 8/26,   - Legs are improving, encouraged Leg el

## 2020-09-03 NOTE — IMAGING NOTE
1315  Hand off report from Bright RN. Fluid sent to lab by Ashlyn Gong for analysis. 1315  VS  HR 69  /68    1327 Draining completed. Patient re scanned. total amount drained 4006 ml. Drain was dc'd.        Pressure to site for 5 mi

## 2020-09-03 NOTE — PROGRESS NOTES
NEPHROLOGY DAILY PROGRESS NOTE       Follow up Reason: CLOVER     SUBJECTIVE:  Febrile overnight   To go for paracentesis later today       OBJECTIVE:    Total Intake/Output:    Intake/Output Summary (Last 24 hours) at 9/3/2020 1058  Last data filed at 9/3/20 Units/hr, Intravenous, Continuous  acetaminophen (TYLENOL) tab 650 mg, 650 mg, Oral, Q6H PRN  traZODone HCl (DESYREL) tab 50 mg, 50 mg, Oral, Nightly PRN  ondansetron HCl (ZOFRAN) injection 4 mg, 4 mg, Intravenous, Q6H PRN  Metoclopramide HCl (REGLAN) inje changing every 14 days  Continuous Blood Gluc  (FREESTYLE BILL READER) Does not apply Device, USE AD DIRECTED  traMADol HCl 50 MG Oral Tab, Take 1 tablet (50 mg total) by mouth every 4 to 6 hours as needed for Pain.   Insulin Pen Needle (BD PEN NEE paracentesis 8/31, 4L removed   - plan for repeat paracentesis today     #. Hypoalbuminemia  - encourage protein intake      DW RN      We will continue to follow Holly Rios.  Fiordaliza Zhu, [address] [facility] - Nephrology

## 2020-09-03 NOTE — PLAN OF CARE
Problem: Patient Centered Care  Goal: Patient preferences are identified and integrated in the patient's plan of care  Description  Interventions:  - What would you like us to know as we care for you?  I have cancer.  - Provide timely, complete, and accurat Free from fall injury  Description  INTERVENTIONS:  - Assess pt frequently for physical needs  - Identify cognitive and physical deficits and behaviors that affect risk of falls.   - [place] fall precautions as indicated by assessment.  - Educate pt/famil Patient sleeping comfortably in bed. Tramadol for pain control. Call light within reach. Bed low and locked. Heparin gtt for DVT ppx; to be d/c'd at 0700 for IR procedure per MD order. Tolerating diet; NPO maintained after midnight.  Ambulating with SBA a

## 2020-09-03 NOTE — PLAN OF CARE
Problem: Patient Centered Care  Goal: Patient preferences are identified and integrated in the patient's plan of care  Description  Interventions:  - What would you like us to know as we care for you?  I have cancer.  - Provide timely, complete, and accur Free from fall injury  Description  INTERVENTIONS:  - Assess pt frequently for physical needs  - Identify cognitive and physical deficits and behaviors that affect risk of falls.   - [place] fall precautions as indicated by assessment.  - Educate pt/famil Progressing    Patient is alert and oriented x4. He is tolerating a general diet, exhibiting a fair appetite.  Heparin infusion was stopped this morning in preparation for paracentesis, then restarted this evening, currently infusing @15ml/hr (1500units/hr

## 2020-09-03 NOTE — PROGRESS NOTES
[facility]acy Note: Antimicrobial Weight Based Dose Adjustment for: ceftriaxone (ROCEPHIN)    Zev Tejada is a 60year old patient who has been prescribed ceftriaxone (ROCEPHIN) 1 g every 24 hours.     Estimated Creatinine Clearance: 18.8 mL/min (A)

## 2020-09-03 NOTE — IMAGING NOTE
1242 Pt to ultrasound room scouts taken by SHAYNA BRIZUELA RT    Pancreatic cancer: Hx taken procedure explained questions answered     Patient consented. 1250    Pt to get albumin 25% 25 grams  carolyn CHARLES  Here to access- scanning completed and revi

## 2020-09-03 NOTE — PROGRESS NOTES
[facility] Hospitalist Progress Note     CC: Hospital Follow up    PCP: Mateo Soria       Assessment/Plan:     Principal Problem:    CLOVER (acute kidney injury) ([facility] [address]  Active Problems:    Decreased oral intake    Cellulitis of lower extremity, unspecified lateral discharged last admit on vanc  - outpatient with rising Cr and supratherapeutic levels  - last dose IV vanc, day before admit   - last level checked here remains up  - on PO vanc for C diff reduced dose by ID  - repeat levels per ID  - pt clinically improv need to paint a doom and gloom picture. He remains hopeful that his kidneys will recover and that he can resume chemotherapy. He is full code. Will continue to discuss with him/ family and palliative + onc following as well.      Palliative care following, 2.65* 2.35* 2.42* 2.29* 2.38* 2.78*   HGB 8.0* 7.2* 7.3* 7.0* 7.3* 8.4*   HCT 25.3* 22.1* 22.4* 21.5* 22.4* 25.8*   MCV 95.5 94.0 92.6 93.9 94.1 92.8   MCH 30.2 30.6 30.2 30.6 30.7 30.2   MCHC 31.6 32.6 32.6 32.6 32.6 32.6   RDW 18.9* 19.1* 18.9* 19.5* 19.

## 2020-09-03 NOTE — PALLIATIVE CARE NOTE
[facility]  Palliative Care Follow Up    Page Lemming Patient Status:  Inpatient    1955 MRN [id number]   Location [facility] 4W/SW/SE Attending Moriah López MD   Hosp Day # 7 PCP Jaylon Bhandari     Date of Consult: Albuterol Sulfate  (90 Base) MCG/ACT inhaler 2 puff, 2 puff, Inhalation, Q4H PRN  •  pancrelipase (Lip-Prot-Amyl) (ZENPEP) DR particles cap 40,000 Units, 40,000 Units, Oral, TID CC  •  glycerin-Hypromellose- (ARTIFICAL TEARS) 0.2-0.2-1 % opht 08/27/2020    BILT 0.50 08/27/2020    TP 6.2 (L) 08/27/2020    AST 23 08/27/2020    ALT 21 08/27/2020    MG 1.6 09/02/2020    PHOS 4.3 09/03/2020       Imaging:  Us Paracentesis W Imaging ([id number]=[id number])    Result Date: 8/31/2020  CONCLUSION: Ultrasound guided Patient/family declined Spiritual Care    Disposition: ongoing goals of care discussions    Assessment/Recommendations:  CLOVER (acute kidney injury) ([facility] [address]  -worsening creat 4.75, renal following  -Pt knows he is NOT a good candidate for HD    Fever  -w/u per spent counseling and coordinating care. Discussed today's visit with Dr. Julio Aguilar and Renetta Villa RN    I will continue to follow clinically.     Nino RoblesM[facility] Center [id number]  9/3/2020  9:12 AM

## 2020-09-03 NOTE — PROGRESS NOTES
[facility] AND CLINICS  Progress Note    Chantale Griffin Patient Status:  Inpatient    1955 MRN [id number]   Location [facility] 4W/SW/SE Attending Pardeep Andrea MD   Hosp Day # 7 PCP Mateo Soria     Subjective:    Fever overnight   Renal 09/02/20  0702 09/03/20  0609   *   < > 83 97 128*   BUN 37.0*   < > 39* 39* 39*   CREATSERUM 3.76*   < > 4.48* 4.37* 4.75*   GFRAA  --    < > 15* 15* 14*   GFRNAA  --    < > 13* 13* 12*   CA  --    < > 8.1* 8.2* 8.5   ALB 2.6*   < > 2.3* 2.3* 2.5* single agent gem for now once renal function established, will also await cell block to send for caris testing, if not enough tissue can do NGS testing in the office ( [facility])     Fever  - bcx in process   - abx per ID      Portal Vein thrombosis   - on he

## 2020-09-03 NOTE — PROGRESS NOTES
[facility] AND [facility] Infectious Disease Progress Note    Naomi Jones Patient Status:  Inpatient    1955 MRN [id number]   Location [facility] 4W/SW/SE Attending Ivy Patricio MD   Hosp Day # 7 PCP Sarath Brown     Subjective:  Patient Nightly PRN  •  ondansetron HCl (ZOFRAN) injection 4 mg, 4 mg, Intravenous, Q6H PRN  •  Metoclopramide HCl (REGLAN) injection 5 mg, 5 mg, Intravenous, Q8H PRN  •  PEG 3350 (MIRALAX) powder packet 17 g, 17 g, Oral, Daily PRN  •  bisacodyl (DULCOLAX) rectal with pain, edema, mottling of skin, now exfolliating  - Failed p.o. cephalexin, bactrim PTA  - Dopplers without clotting  - IV vancomycin was completing as an OP - now held for CLOVER   - Last dose on 8/26,   - Legs are improving, encouraged Leg elevation, co

## 2020-09-03 NOTE — PAYOR COMM NOTE
--------------  CONTINUED STAY REVIEW-----REQUESTING ADDITIONAL DAY 9/3      Payor: Kiara SPEARS/SHIVANI  Subscriber #:  [id number]  Authorization Number: [id number]    Admit date: 8/27/20  Admit time: 1953    Admitting Physician: Asiya Hodges MD  Attend - intiial urine Na < 5- prenal v hepatorenal d/w Dr Micah Rome on 8/28 suspect mixed picture etiology for CLOVER- with third spacing of fluids (LE swelling /abd ascites) given albumin post para as well  - repeat urine studies now more c/w ATN  - BMP Cr 4.37, sligh - not on SSI last admit can monitor gluc on BMP and start accuchecks if up     Cancer related pain  - follows with [facility] palliative  - cont home regimen   - pt ok with continuing tramadol for today      GERD  - PPI      [place]  - CPAP qhs     GOC d/w pt + coby 08/27/20 2007 : 219 lb 3.2 oz (99.4 kg)  08/27/20 1640 : 218 lb (98.9 kg)  08/27/20 1443 : 218 lb 8 oz (99.1 kg)  08/21/20 0900 : 214 lb 4.8 oz (97.2 kg)  08/17/20 1409 : 200 lb (90.7 kg)        Exam   Gen: No acute distress, alert and oriented x3   Heent:    • Albumin Human  25 g Intravenous Once   • vancomycin HCl  125 mg Oral BID   • dilTIAZem HCl ER Coated Beads  120 mg Oral Daily   • sodium bicarbonate  1,300 mg Oral TID   • clotrimazole-betamethasone   Topical BID   • pancrelipase (Lip-Prot-Amyl)  40,0 9/2/2020 2113 Given 1300 mg Oral Aileen Dinero RN    9/2/2020 1808 Given 1300 mg Oral Roni Pérez RN      traMADol HCl Corea Timothy) tab 50 mg     Date Action Dose Route User    9/3/2020 7893 Given 50 mg Oral Aileen Dinero RN    9/2/2020 1808 Given 50 mg Or

## 2020-09-04 NOTE — PROGRESS NOTES
[facility] Infectious Disease Progress Note    Deya Rao Patient Status:  Inpatient    1955 MRN [id number]   Location [facility] 4W/SW/SE Attending Calos Soria MD   Hosp Day # 8 PCP Adam Diaz     Subjective:  Patient heparin (PORCINE) drip 52224bbrnc/250mL infusion CONTINUOUS, 200-3,000 Units/hr, Intravenous, Continuous  •  acetaminophen (TYLENOL) tab 650 mg, 650 mg, Oral, Q6H PRN  •  traZODone HCl (DESYREL) tab 50 mg, 50 mg, Oral, Nightly PRN  •  ondansetron HCl (ZOFR 09/04/2020    CA 8.1 09/04/2020    ALB 2.2 09/04/2020    ALKPHO 239 09/04/2020    BILT 0.6 09/04/2020    TP 5.4 09/04/2020    AST 18 09/04/2020    ALT 13 09/04/2020    PTT 71.5 09/04/2020    INR 1.42 09/04/2020    MG 1.6 09/04/2020       Radiology:  Review BID ppx dosing. Overall grave prognosis, Will follow with further recommendations. If you have any questions or concerns please call [facility] at [phone].      Oseas Hui MD  9/4/2020  1:37 PM

## 2020-09-04 NOTE — PROGRESS NOTES
[facility] AND CLINICS  Progress Note    Mellissa Dewitt Patient Status:  Inpatient    1955 MRN [id number]   Location [facility] 4W/SW/SE Attending Narendra Lizarraga MD   [facility] Day # 8 PCP Silvio Gray     Subjective:  Renal function slightly i Basophil Peritoneal 0 %    Other Cell Peritoneal 1 %   BODY FLUID CULT,AEROBIC AND ANAEROBIC    Collection Time: 09/03/20 12:57 PM   Result Value Ref Range    Body Fluid Smear 1+ WBCs seen     Body Fluid Smear No organisms seen     Body Fluid Smear This is DIFFERENTIAL    Collection Time: 09/04/20  6:45 AM   Result Value Ref Range    WBC 4.1 4.0 - 11.0 x10(3) uL    RBC 2.30 (L) 4.30 - 5.70 x10(6)uL    HGB 7.1 (L) 13.0 - 17.5 g/dL    HCT 21.3 (L) 39.0 - 53.0 %    MCV 92.6 80.0 - 100.0 fL    MCH 30.9 26.0 - 34 Date: 9/3/2020  CONCLUSION: Ultrasound guided paracentesis, as detailed above.    Supervising Physician: Jayme Silva MD.    Dictated by (CST): Jared Jesus on 9/03/2020 at 3:00 PM     Finalized by (CST): Jared Jesus on 9/03/2020 at 3:01 PM CLOVER  - okay to start coumadin - 5mg daily     CLOVER  - appreciate renal recs   - pre-renal vs hepatorenal vs vanc related, management per nephrology , patient willing to go through HD if needed   - improving cr 4.14 today      Anemia  - secondary to chemo  -

## 2020-09-04 NOTE — PAYOR COMM NOTE
--------------  CONTINUED STAY REVIEW------REQUESTING ADDITIONAL DAY 9/4      Payor: Reginald SPEARS/SHIVANI  Subscriber #:  [id number]  Authorization Number: [id number]    Admit date: 8/27/20  Admit time: 1953    Admitting Physician: Ingrid Lozoya MD  Attend •  Fluticasone Furoate-Vilanterol (BREO ELLIPTA) 200-25 MCG/INH inhaler 1 puff, 1 puff, Inhalation, Daily  •  LORazepam (ATIVAN) tab 0.25 mg, 0.25 mg, Oral, BID PRN  •  Montelukast Sodium (SINGULAIR) tab 10 mg, 10 mg, Oral, Nightly  •  Pantoprazole Sodium Extremities:  No lower extremity edema noted. Without clubbing or cyanosis. Skin: Normal texture and turgor. Neurologic: Cranial nerves are grossly intact.   Motor strength and sensory examination is grossly normal.  No focal neurologic deficit.     La - infection work up with PCT of 1.05, will trend,   -CXR with LLL Infiltrate, complained of thick phlegm, ?  Post nasal drainage, sinusitis,   - UA is clean, Blood cul are NGTD, Ascitic fluid with no evidence of infection,   - Diarrhea is not worst, On BID Montelukast Sodium (SINGULAIR) tab 10 mg     Date Action Dose Route User    9/3/2020 2015 Given 10 mg Oral Fantasma Weldon RN      pancrelipase (Lip-Prot-Amyl) (ZENPEP) DR particles cap 40,000 Units     Date Action Dose Route User    9/4/2020 1255 Given

## 2020-09-04 NOTE — PLAN OF CARE
No acute changes overnight. Pain managed with PRN tramadol. Heparin gtt infusing, titrated per protocol. Bleeding precautions maintained. Afebrile overnight. Cpap worn overnight. Sputum culture collected and sent.  Up with standby assist. Voiding freely, ur management  - Manage/alleviate anxiety  - Utilize distraction and/or relaxation techniques  - Monitor for opioid side effects  - Notify MD/LIP if interventions unsuccessful or patient reports new pain  - Anticipate increased pain with activity and pre-medi Monitor Blood Glucose as ordered  - Assess for signs and symptoms of hyperglycemia and hypoglycemia  - Administer ordered medications to maintain glucose within target range  - Assess barriers to adequate nutritional intake and initiate nutrition consult a

## 2020-09-04 NOTE — PLAN OF CARE
Patient alert and oriented. Rocephin given. Given tramadol for generalized pain with relief. VSS. Right arm with CGM. Heparin drip at 12. Isolation maintained. Regular diet, minimal appetite. Up with SBA. Call light within reach, using appropraitely.      P assistance with activity based on assessment  - Modify environment to reduce risk of injury  - Provide assistive devices as appropriate  - Consider OT/PT consult to assist with strengthening/mobility  - Encourage toileting schedule  Outcome: Progressing

## 2020-09-04 NOTE — DIETARY NOTE
ADULT NUTRITION BRIEF NOTE    Pt is at low nutrition risk. RECOMMENDATIONS TO MD:  See Nutrition Intervention    PATIENT STATUS: Received consult to assist patient with menu selection.   Per conversation with RN, patient not eating much, especially pro --  1.6    139 136 137   K 4.0 4.0 4.5 3.9    108 107 107   CO2 19.0* 20.0* 18.0* 21.0   PHOS 4.9 5.0*  4.9 4.3  --    OSMOCALC 295 297* 293 293     NUTRITION PRESCRIPTION:  Diet: Regular/General  Oral Supplements: as above     - Nutrition Goal

## 2020-09-04 NOTE — PROGRESS NOTES
[facility] Hospitalist Progress Note     CC: Hospital Follow up    PCP: Lexy Hoskins       Assessment/Plan:     Principal Problem:    CLOVER (acute kidney injury) ([facility] [address]  Active Problems:    Decreased oral intake    Cellulitis of lower extremity, unspecified lateral day before admit   - last level checked here remains up  - on PO vanc for C diff reduced dose by ID  - repeat levels per ID  - pt clinically improving     Abdominal distension 2/2 ascites  - sp 4 L off d/w ID re WBC in fluid if SBP coverage per ID not need his kidneys will recover and that he can resume chemotherapy. He is full code. Will continue to discuss with him/ family and palliative + onc following as well.      Palliative care following, discussed with Jerilyn       Prophylaxis:   DVT with heparin dri 2.35* 2.42*   < > 2.38* 2.78* 2.30*   HGB 7.2* 7.3*   < > 7.3* 8.4* 7.1*   HCT 22.1* 22.4*   < > 22.4* 25.8* 21.3*   MCV 94.0 92.6   < > 94.1 92.8 92.6   MCH 30.6 30.2   < > 30.7 30.2 30.9   MCHC 32.6 32.6   < > 32.6 32.6 33.3   RDW 19.1* 18.9*   < > 19.6* Oral Nightly   • Pantoprazole Sodium  40 mg Oral QAM AC   • Acidophilus/Pectin  1 capsule Oral Daily     • Continuous dose Heparin infusion 1,200 Units/hr (09/04/20 1256)     Saline Nasal Spray, dicyclomine, traMADol HCl, Albuterol Sulfate HFA, glycerin-Hy

## 2020-09-04 NOTE — PROGRESS NOTES
NEPHROLOGY DAILY PROGRESS NOTE       Follow up Reason: CLOVER     SUBJECTIVE:  Mayito Mae is feeling OK today. Shares that his UOP is improving.        OBJECTIVE:    Total Intake/Output:    Intake/Output Summary (Last 24 hours) at 9/4/2020 7569  Last AC  Prochlorperazine Maleate (COMPAZINE) tab 10 mg, 10 mg, Oral, Q8H PRN  Acidophilus/Pectin (PROBIOTIC) CAPS 1 capsule, 1 capsule, Oral, Daily  Normal Saline Flush 0.9 % injection 3 mL, 3 mL, Intravenous, PRN  heparin (PORCINE) drip 81744uniwz/250mL infus MG Oral Tab, Take 1,000 mg by mouth 2 (two) times daily with meals. traMADol HCl 50 MG Oral Tab, Take 1-2 tablets ( mg total) by mouth every 4 (four) hours as needed for Pain.   Continuous Blood Gluc Sensor (TOMS ShoesSTYLE BILL 14 DAY SENSOR) Does not recently on gemcitabine and abraxane  - per heme/onc       #. Ascites and lower extremity edema   - s/p paracentesis 8/31, 4L removed     #.  Hypoalbuminemia  - encourage protein intake      Rosey Bahena MD  [facility]

## 2020-09-04 NOTE — CM/SW NOTE
Care Progression Note:  Active Acute Medical Issue:   CLOVER (acute kidney injury) ([facility] [address]   -Recent admit for LE cellulitis and Cdiff colitis (discharged on IV and PO vancomycin)    Bilateral LE cellulitis with erythema  on chronic edema  -[facility] ID team followin

## 2020-09-05 NOTE — PLAN OF CARE
Pt is alert and oriented x4. Pain is managed with PRN Tramadol. Heparin gtt infusing. General diet, decreased appetite. Isolation precautions maintained. Fall precautions in place. Call light within reach.     Problem: Patient Centered Care  Goal: Patient p Notify MD/LIP if interventions unsuccessful or patient reports new pain  - Anticipate increased pain with activity and pre-medicate as appropriate  Outcome: Progressing     Problem: SAFETY ADULT - FALL  Goal: Free from fall injury  Description  INTERVENTIO to maintain glucose within target range  - Assess barriers to adequate nutritional intake and initiate nutrition consult as needed  - Instruct patient on self management of diabetes  Outcome: Progressing

## 2020-09-05 NOTE — PROGRESS NOTES
[facility] AND CLINICS  [facility] Infectious Disease  Progress Note    Pretty Pauln Patient Status:  Inpatient    1955 MRN [id number]   Location [facility] 4W/SW/SE Attending [place] Prather, [facility]  Day # 9 PCP Kena Aceves     Subjective:  Sharon this     2. [place] Clutter after clindamycin tried as an outpatient  - C.diff positive last admission  - P.o. vancomycin ongoing - now on BID suppression without diarrhea     3.  Metastatic pancreatic cancer  - Worsening on recent imaging  - Chemo postponed for

## 2020-09-05 NOTE — PLAN OF CARE
Problem: Patient Centered Care  Goal: Patient preferences are identified and integrated in the patient's plan of care  Description  Interventions:  - What would you like us to know as we care for you?  I have cancer.  - Provide timely, complete, and accur Free from fall injury  Description  INTERVENTIONS:  - Assess pt frequently for physical needs  - Identify cognitive and physical deficits and behaviors that affect risk of falls.   - [place] fall precautions as indicated by assessment.  - Educate pt/famil unit PRBC transfused for anemia, repeat hgb 7.5. Tolerating diet but has low appetite. Voiding freely. Tramadol given prn for pain control.  Heparin gtt infusing at 12ml/hr , next PTT check tomorrow AM.

## 2020-09-05 NOTE — PROGRESS NOTES
NEPHROLOGY DAILY PROGRESS NOTE       Follow up Reason: CLOVER     SUBJECTIVE:  Sina Jensen is feeling OK this afternoon. Getting PRBC transfusion.      OBJECTIVE:    Total Intake/Output:    Intake/Output Summary (Last 24 hours) at 9/5/2020 1323  Last d 10 mg, 10 mg, Oral, Q8H PRN  Acidophilus/Pectin (PROBIOTIC) CAPS 1 capsule, 1 capsule, Oral, Daily  Normal Saline Flush 0.9 % injection 3 mL, 3 mL, Intravenous, PRN  heparin (PORCINE) drip 29986vgefz/250mL infusion CONTINUOUS, 200-3,000 Units/hr, Intraveno times daily with meals. traMADol HCl 50 MG Oral Tab, Take 1-2 tablets ( mg total) by mouth every 4 (four) hours as needed for Pain.   Continuous Blood Gluc Sensor (FREESTYLE BILL 14 DAY SENSOR) Does not apply Misc, Use daily, changing every 14 day Hyponatremia  - improved       #. Pancreatic adenocarcinoma  - Most recently on gemcitabine and abraxane  - per heme/onc       #. Ascites and lower extremity edema   - s/p paracentesis 8/31, 4L removed     #.  Hypoalbuminemia  - encourage protein intake

## 2020-09-05 NOTE — PROGRESS NOTES
[facility] Hospitalist Progress Note     CC: Hospital Follow up    PCP: [place]       Assessment/Plan:     Principal Problem:    CLOVER (acute kidney injury) ([facility] [address]  Active Problems:    Decreased oral intake    Cellulitis of lower extremity, unspecified lateral last admit on vanc  - outpatient with rising Cr and supratherapeutic levels  - last dose IV vanc, day before admit   - last level checked here remains up  - on PO vanc for C diff reduced dose by ID  - repeat levels per ID  - pt clinically improving     Abd grasped this yet saying we don't need to paint a doom and gloom picture. He remains hopeful that his kidneys will recover and that he can resume chemotherapy. He is full code.  Will continue to discuss with him/ family and palliative + onc following as well Recent Labs   Lab 08/30/20  0512  09/03/20  0609 09/04/20  0645 09/05/20  0534   RBC 2.42*   < > 2.78* 2.30* 2.19*   HGB 7.3*   < > 8.4* 7.1* 6.7*   HCT 22.4*   < > 25.8* 21.3* 20.4*   MCV 92.6   < > 92.8 92.6 93.2   MCH 30.2   < > 30.2 30.9 30.6   MCH Daily   • Montelukast Sodium  10 mg Oral Nightly   • Pantoprazole Sodium  40 mg Oral QAM AC   • Acidophilus/Pectin  1 capsule Oral Daily     • Continuous dose Heparin infusion 1,200 Units/hr (09/05/20 1123)     Saline Nasal Spray, dicyclomine, traMADol HCl

## 2020-09-06 NOTE — PLAN OF CARE
Problem: Patient Centered Care  Goal: Patient preferences are identified and integrated in the patient's plan of care  Description  Interventions:  - What would you like us to know as we care for you?  I have cancer.  - Provide timely, complete, and accur Free from fall injury  Description  INTERVENTIONS:  - Assess pt frequently for physical needs  - Identify cognitive and physical deficits and behaviors that affect risk of falls.   - [place] fall precautions as indicated by assessment.  - Educate pt/famil Tramadol for pain control. Heparin gtt infusing at 12ml/hr, next PTT draw is tomorrow AM. Dose of IVP lasix given again today as ordered. Tolerating general/regular diet well, denies nausea. Voiding freely.  Updated with plan of care and lab work from [facility]

## 2020-09-06 NOTE — PROGRESS NOTES
NEPHROLOGY DAILY PROGRESS NOTE       Follow up Reason: CLOVER     SUBJECTIVE:  [place] Shutter is feeling OK today. Noticed an increase in urine output after receiving furosemide yesterday.      OBJECTIVE:    Total Intake/Output:    Intake/Output Summary ( tab 10 mg, 10 mg, Oral, Nightly  Pantoprazole Sodium (PROTONIX) EC tab 40 mg, 40 mg, Oral, QAM AC  Prochlorperazine Maleate (COMPAZINE) tab 10 mg, 10 mg, Oral, Q8H PRN  Acidophilus/Pectin (PROBIOTIC) CAPS 1 capsule, 1 capsule, Oral, Daily  Normal Saline Fl mcg into the lungs 2 (two) times daily. Take 2 puffs by mouth in A.M and again in P.M  metFORMIN HCl 1000 MG Oral Tab, Take 1,000 mg by mouth 2 (two) times daily with meals.     traMADol HCl 50 MG Oral Tab, Take 1-2 tablets ( mg total) by mouth every Normal Anion Gap Metabolic Acidosis  - Due to GI losses from diarrhea, also with CLOVER   - continue sodium bicarbonate tablets       #. Mild Hyponatremia  - improved       #.  Pancreatic adenocarcinoma  - Most recently on gemcitabine and abraxane  - per heme/

## 2020-09-06 NOTE — PROGRESS NOTES
[facility] Hospitalist Progress Note     CC: Hospital Follow up    PCP: Jayne Ritter       Assessment/Plan:     Principal Problem:    CLOVER (acute kidney injury) ([facility] [address]  Active Problems:    Decreased oral intake    Cellulitis of lower extremity, unspecified lateral bactrim- discharged last admit on vanc  - outpatient with rising Cr and supratherapeutic levels  - last dose IV vanc, day before admit   - last level checked here remains up  - on PO vanc for C diff reduced dose by ID  - repeat levels per ID  - pt clinical poor and in conversations with him- he has not quite grasped this yet saying we don't need to paint a doom and gloom picture. He remains hopeful that his kidneys will recover and that he can resume chemotherapy. He is full code.  Will continue to discuss wi mood and affect      Data Review:       Labs:     Recent Labs   Lab 09/04/20  0645 09/05/20  0534 09/05/20  1547 09/06/20  0603   RBC 2.30* 2.19*  --  2.65*   HGB 7.1* 6.7* 7.5* 8.2*   HCT 21.3* 20.4*  --  24.6*   MCV 92.6 93.2  --  92.8   MCH 30.9 30.6  -

## 2020-09-06 NOTE — PLAN OF CARE
Problem: Patient Centered Care  Goal: Patient preferences are identified and integrated in the patient's plan of care  Description  Interventions:  - What would you like us to know as we care for you?  I have cancer.  - Provide timely, complete, and accur Free from fall injury  Description  INTERVENTIONS:  - Assess pt frequently for physical needs  - Identify cognitive and physical deficits and behaviors that affect risk of falls.   - [place] fall precautions as indicated by assessment.  - Educate pt/famil Bryant Roy is aware of his plan of care. Patient is alert and oriented x4, room air. Pain controlled with PRN Tramadol. Denies any nausea. On heparin gtt infusing at 12ml/hr. Bal hose applied to bilat legs.  Patient is up with standby assist. Voiding using

## 2020-09-06 NOTE — PROGRESS NOTES
[facility] AND [facility] Infectious Disease  Progress Note    Mayito Carmelita Patient Status:  Inpatient    1955 MRN [id number]   Location [facility] 4W/SW/SE Attending Pepe Moon MD   Hosp Day # 10 PCP Franklin Bianchi     Subjective:  Lisette ongoing     2. Keith Coyle after clindamycin tried as an outpatient  - C.diff positive last admission  - P.o. vancomycin ongoing - now on BID suppression without diarrhea     3.  Metastatic pancreatic cancer  - Worsening on recent imaging  - Chemo postponed

## 2020-09-07 NOTE — PLAN OF CARE
Problem: Patient Centered Care  Goal: Patient preferences are identified and integrated in the patient's plan of care  Description  Interventions:  - What would you like us to know as we care for you?  I have cancer.  - Provide timely, complete, and accur Free from fall injury  Description  INTERVENTIONS:  - Assess pt frequently for physical needs  - Identify cognitive and physical deficits and behaviors that affect risk of falls.   - [place] fall precautions as indicated by assessment.  - Educate pt/famil Gary Castellanos is aware of his plan of care. Patient is alert and oriented x4, room air. Pain controlled with PRN Tramadol. Denies any nausea. On heparin gtt infusing at 12ml/hr.  Patient is up with standby assist. Voiding using urinal. Isolation precautions main

## 2020-09-07 NOTE — PLAN OF CARE
Plan of care/ morning lab work reviewed with Cele Gray. Tramadol for pain control. Heparin gtt infusing at 12ml/hr. Dose of IVP lasix given as ordered. Low appetite today. Voiding freely. Ambulating in room, encouraged to increase activity.  Safety measures in Utilize distraction and/or relaxation techniques  - Monitor for opioid side effects  - Notify MD/LIP if interventions unsuccessful or patient reports new pain  - Anticipate increased pain with activity and pre-medicate as appropriate  Outcome: Progressing signs and symptoms of hyperglycemia and hypoglycemia  - Administer ordered medications to maintain glucose within target range  - Assess barriers to adequate nutritional intake and initiate nutrition consult as needed  - Instruct patient on self management

## 2020-09-07 NOTE — PROGRESS NOTES
NEPHROLOGY DAILY PROGRESS NOTE       Follow up Reason: CLOVER     SUBJECTIVE:  Robe Acosta is feeling OK. Appetite remains poor.     OBJECTIVE:    Total Intake/Output:    Intake/Output Summary (Last 24 hours) at 9/7/2020 1411  Last data filed at 9/7/20 (PROTONIX) EC tab 40 mg, 40 mg, Oral, QAM AC  Prochlorperazine Maleate (COMPAZINE) tab 10 mg, 10 mg, Oral, Q8H PRN  Acidophilus/Pectin (PROBIOTIC) CAPS 1 capsule, 1 capsule, Oral, Daily  Normal Saline Flush 0.9 % injection 3 mL, 3 mL, Intravenous, PRN  hep by mouth in A.M and again in P.M  metFORMIN HCl 1000 MG Oral Tab, Take 1,000 mg by mouth 2 (two) times daily with meals. traMADol HCl 50 MG Oral Tab, Take 1-2 tablets ( mg total) by mouth every 4 (four) hours as needed for Pain.   Continuous Blood diarrhea, also with CLOVER   - continue sodium bicarbonate tablets       #. Mild Hyponatremia  - improved       #. Pancreatic adenocarcinoma  - Most recently on gemcitabine and abraxane  - per heme/onc       #.  Ascites and lower extremity edema   - s/p parace

## 2020-09-07 NOTE — PROGRESS NOTES
[facility] Hospitalist Progress Note     CC: Hospital Follow up    PCP: Monalisa Mcnally       Assessment/Plan:     Principal Problem:    CLOVER (acute kidney injury) ([facility] [address]  Active Problems:    Decreased oral intake    Cellulitis of lower extremity, unspecified lateral improving    Pancytopenia  - secondary to chemo/malignancy  - anemia, hgb 6.7  - transfuse 1 unit  --> 8.2-> 7.6     Abdominal distension 2/2 ascites  - sp 4 L off d/w ID re WBC in fluid if SBP coverage per ID not needed  - d/w Dr Edward Cast re repeat fluid t temperature source Oral, resp. rate 18, height 6' 3\" (1.905 m), weight 206 lb 1.6 oz (93.5 kg), SpO2 97 %.     Temp:  [98 °F (36.7 °C)-98.2 °F (36.8 °C)] 98 °F (36.7 °C)  Pulse:  [70-82] 82  Resp:  [16-18] 18  BP: (130-149)/(74-85) 130/80      Intake/Outpu 2.2*         Imaging:  No results found.       Meds:     • Warfarin Sodium  7.5 mg Oral Nightly   • cefTRIAXone  2 g Intravenous Q24H   • vancomycin HCl  125 mg Oral BID   • dilTIAZem HCl ER Coated Beads  120 mg Oral Daily   • sodium bicarbonate  1,300 mg O

## 2020-09-07 NOTE — PROGRESS NOTES
[facility] AND [facility] Infectious Disease  Progress Note    Jad Phillips Patient Status:  Inpatient    1955 MRN [id number]   Location [facility] 4W/SW/SE Attending Phyllis Diehl MD   Hosp Day # 11 PCP Nish Nj     Subjective:  Lisette ongoing     2. Antonio Daniel after clindamycin tried as an outpatient  - C.diff positive last admission  - P.o. vancomycin ongoing - now on BID suppression without diarrhea     3.  Metastatic pancreatic cancer with CA 19-1  - Worsening on recent imaging  [facility]

## 2020-09-08 NOTE — PLAN OF CARE
Patient alert and oriented. Rocephin given. Given tramadol for generalized pain with relief. VSS. Right arm with CGM. Heparin drip at 12. Isolation maintained. Regular diet, minimal appetite. Up with SBA. Call light within reach, using appropraitely.      1 assessment.  - Educate pt/family on patient safety including physical limitations  - Instruct pt to call for assistance with activity based on assessment  - Modify environment to reduce risk of injury  - Provide assistive devices as appropriate  - Consider

## 2020-09-08 NOTE — PROGRESS NOTES
NEPHROLOGY DAILY PROGRESS NOTE       Follow up Reason: CLOVER     SUBJECTIVE:  Jennifer Huang is feeling OK. Appetite remains poor. OBJECTIVE:    Laying in bed daughter at bedside.     Total Intake/Output:    Intake/Output Summary (Last 24 hours) at 9/ QAM AC  Prochlorperazine Maleate (COMPAZINE) tab 10 mg, 10 mg, Oral, Q8H PRN  Acidophilus/Pectin (PROBIOTIC) CAPS 1 capsule, 1 capsule, Oral, Daily  Normal Saline Flush 0.9 % injection 3 mL, 3 mL, Intravenous, PRN  heparin (PORCINE) drip [id number]/250mL i P.M  metFORMIN HCl 1000 MG Oral Tab, Take 1,000 mg by mouth 2 (two) times daily with meals. traMADol HCl 50 MG Oral Tab, Take 1-2 tablets ( mg total) by mouth every 4 (four) hours as needed for Pain.   Continuous Blood Gluc Sensor (FREESTYLE BILL recently on gemcitabine and abraxane  - per heme/onc       #. Ascites and lower extremity edema   - s/p paracentesis 8/31, 4L removed     #.  Hypoalbuminemia  - encourage protein intake      Marizol Goodrich MD  [address] [facility]  Nephrology

## 2020-09-08 NOTE — PLAN OF CARE
Heparin drip infusing, next PTT 9/9 @ 0511. Coumadin given per orders. VSS, afebrile. CPAP in place during sleep. Tramadol for pain prn. Ambulating to bathroom independently. Voiding well. 3 small loose stools overnight. Safety plan in place.  Plan is ongoi ADULT - FALL  Goal: Free from fall injury  Description  INTERVENTIONS:  - Assess pt frequently for physical needs  - Identify cognitive and physical deficits and behaviors that affect risk of falls. - [place] fall precautions as indicated by assessment.

## 2020-09-08 NOTE — PROGRESS NOTES
[facility] AND [facility] Infectious Disease Progress Note    Eri Rico Patient Status:  Inpatient    1955 MRN [id number]   Location [facility] 4W/SW/SE Attending Héctor Cabezas MD   Hosp Day # 12 PCP Deedee Shipman     Subjective:  Pt wit [id number]/250mL infusion CONTINUOUS, 200-3,000 Units/hr, Intravenous, Continuous  •  acetaminophen (TYLENOL) tab 650 mg, 650 mg, Oral, Q6H PRN  •  traZODone HCl (DESYREL) tab 50 mg, 50 mg, Oral, Nightly PRN  •  ondansetron HCl (ZOFRAN) injection 4 mg, 4 m PO vancomycin BID  3. CLOVER on CKD  -Cr improving slightly at 3.5  -nephrology on consult  4. Hx of metastatic pancreatic CA  -worsening in repeat imaging  -chemo on hold  5. Ascites  -s/p paracentesis  6.   Fever and leukocytosis  -WBC normalized  -Eliza Osorio

## 2020-09-08 NOTE — PAYOR COMM NOTE
--------------  CONTINUED STAY REVIEW------REQUESTING ADDITIONAL DAY 9/5, 9/6, 9/7, AND 9/8    Payor: [address] [place] [address] #:  [id number]  Authorization Number: [id number]    Admit date: 8/27/20  Admit time: 1953    Admitting Physician: Hitesh High - intiial urine Na < 5- prenal v hepatorenal d/w Dr Glendora Claude on 8/28 suspect mixed picture etiology for CLOVER- with third spacing of fluids (LE swelling /abd ascites) given albumin post para as well  - repeat urine studies now more c/w ATN  - BMP Cr 3.9 improve - follows with [facility] palliative  - cont home regimen   - pt ok with continuing tramadol       GERD  - PPI      LONDON  - CPAP qhs     [facility] d/w pt + daughter at [address] 9/1 by Theo Cadet, Dr Greg Quintana, and palliative (separately).  Bryant Roy has pancreatic cancer th 08/27/20 1640 : 218 lb (98.9 kg)  08/27/20 1443 : 218 lb 8 oz (99.1 kg)  08/21/20 0900 : 214 lb 4.8 oz (97.2 kg)  08/17/20 1409 : 200 lb (90.7 kg)        Exam   Gen: No acute distress, alert and oriented x3   Heent: NC AT  Pulm: Lungs clear, normal respira CONCLUSION: Ultrasound guided paracentesis, as detailed above.    Supervising Physician: Vira Aldrich MD.    Dictated by (CST): Renetta Jesus on 9/03/2020 at 3:00 PM     Finalized by (CST): Renetta Jesus on 9/03/2020 at 3:01 PM                 Meds: Recent admit for  bl LE cellulitis and Cdiff infection, supratherapeutic vancomycin level  Pancreatic adenocarcinoma  Cancer related pain  Chronic anemia from chemotherapy  Portal vein thrombosis on lovenox (occurred on eliquis)  afib  DM  GERD  HTN  LONDON - d/w Dr Marquis Fairly- she has d/w pt and daughter re prognosis.  I as well as palliative have asked pt their understanding of this conversation and he keeps focused on when he can have his next chemo  - his CA 19-9 is 31,016 and Dr Chantell Hayes has asked path No CP, SOB, or N/V. Feeling better, but tired, abd improved, no fevers, no nausea or vomiting     OBJECTIVE:     Blood pressure 137/84, pulse 83, temperature 98.2 °F (36.8 °C), temperature source Oral, resp.  rate 16, height 6' 3\" (1.905 m), weight 206 lb  106 105   CO2 21.0 23.0 23.0                 Recent Labs   Lab 08/31/20  1017 09/01/20  0619 09/02/20  0702 09/03/20  0609 09/04/20  0544   ALT  --   --   --   --  13*   AST  --   --   --   --  18   ALB 2.4* 2.3* 2.3* 2.5* 2.2*            Imaging: hyponatremia  Abdominal distension  ascites  Recent admit for  bl LE cellulitis and Cdiff infection, supratherapeutic vancomycin level  Pancreatic adenocarcinoma  Cancer related pain  Chronic anemia from chemotherapy  Portal vein thrombosis on lovenox (occ 09/03/20 0616 : 251 lb (113.9 kg)  08/30/20 0501 : 223 lb 14.4 oz (101.6 kg)  08/27/20 2007 : 219 lb 3.2 oz (99.4 kg)  08/27/20 1640 : 218 lb (98.9 kg)  08/27/20 1443 : 218 lb 8 oz (99.1 kg)  08/21/20 0900 : 214 lb 4.8 oz (97.2 kg)  08/17/20 1409 : 200 lb • Montelukast Sodium  10 mg Oral Nightly   • Pantoprazole Sodium  40 mg Oral QAM AC   • Acidophilus/Pectin  1 capsule Oral Daily      • Continuous dose Heparin infusion 1,200 Units/hr (09/07/20 0640)      Saline Nasal Spray, dicyclomine, traMADol HCl, [place] •  glycerin-Hypromellose- (ARTIFICAL TEARS) 0.2-0.2-1 % ophthalmic solution 1-2 drop, 1-2 drop, Both Eyes, QID PRN  •  Fluticasone Furoate-Vilanterol (BREO ELLIPTA) 200-25 MCG/INH inhaler 1 puff, 1 puff, Inhalation, Daily  •  LORazepam (ATIVAN) tab Neurologic: Cranial nerves are grossly intact.   Motor strength and sensory examination is grossly normal.  No focal neurologic deficit.     Labs:        Lab Results   Component Value Date     WBC 4.6 09/08/2020     HGB 7.7 09/08/2020     HCT 23.7 09/08/202 9/8/2020 0600 Hi-Risk Rate/Dose Verify 1200 Units/hr Intravenous Lorri Quigley RN    9/8/2020 0539 New Bag 1200 Units/hr Intravenous Lorri Quigley RN    9/8/2020 0000 Hi-Risk Rate/Dose Verify 1200 Units/hr Intravenous Lorri Quigley RN Date Action Dose Route User    9/7/2020 2022 Given 7.5 mg Oral Cristi Gay RN          Procedures:      Plan:

## 2020-09-08 NOTE — PROGRESS NOTES
[facility] Hospitalist Progress Note     CC: Hospital Follow up    PCP: [place]       Assessment/Plan:     Principal Problem:    CLOVER (acute kidney injury) ([facility] [address]  Active Problems:    Decreased oral intake    Cellulitis of lower extremity, unspecified lateral understanding of this conversation and he keeps focused on when he can have his next chemo  - his CA 19-9 is 31,016 and Dr Marika Valadez has asked path to add markers to cytology  - see [address] prior progress notes     Portal vein thrombus  - occurred on eliquis kg)  08/27/20 1640 : 218 lb (98.9 kg)  08/27/20 1443 : 218 lb 8 oz (99.1 kg)  08/21/20 0900 : 214 lb 4.8 oz (97.2 kg)  08/17/20 1409 : 200 lb (90.7 kg)      Exam   Gen: No acute distress, alert and oriented x3   Heent: NC AT  Pulm: Lungs clear, normal resp Sulfate HFA, glycerin-Hypromellose-, LORazepam, Prochlorperazine Maleate, Normal Saline Flush, acetaminophen, traZODone HCl, ondansetron HCl, Metoclopramide HCl, PEG 3350, bisacodyl

## 2020-09-09 NOTE — PAYOR COMM NOTE
--------------  CONTINUED STAY REVIEW-------REQUESTING ADDITIONAL DAY 9/9      Payor: Anyi SPEARS/SHIVANI  Subscriber #:  [id number]  Authorization Number: [id number]    Admit date: 8/27/20  Admit time: 1954    Admitting Physician: MD Nasir Goodwin •  Fluticasone Furoate-Vilanterol (BREO ELLIPTA) 200-25 MCG/INH inhaler 1 puff, 1 puff, Inhalation, Daily  •  LORazepam (ATIVAN) tab 0.25 mg, 0.25 mg, Oral, BID PRN  •  Montelukast Sodium (SINGULAIR) tab 10 mg, 10 mg, Oral, Nightly  •  Pantoprazole Sodium Component Value Date     WBC 4.8 09/09/2020     HGB 7.8 09/09/2020     HCT 23.7 09/09/2020     .0 09/09/2020     CREATSERUM 3.33 09/09/2020     BUN 38 09/09/2020      09/09/2020     K 3.8 09/09/2020      09/09/2020     CO2 26.0 09/09/202 9/8/2020 2103 Given 10 mg Oral Hollis Rodriguez, RN      pancrelipase (Lip-Prot-Amyl) (ZENPEP) DR particles cap 40,000 Units     Date Action Dose Route User    9/9/2020 0917 Given 35917 Units Oral Femi Whalen RN    9/8/2020 1631 Given 51708 Units Oral

## 2020-09-09 NOTE — DISCHARGE SUMMARY
General Medicine Discharge Summary     Patient ID:  Deya Roa  60year old  5/20/1955    Admit date: 8/27/2020    Discharge date and time: 9/9/2020    Attending Physician: Calos Soria MD     Consults: IP CONSULT TO [address] [place] [address],[address] Patient is a 60year old male with PMH sig for pancreatic adenocarcinoma, portal venous thrombus, afib, DM, GERD, HTN, LONDON, anemia, recent admit for LE cellulitis and Cdiff colitis discharged on IV and PO vancomycin who presents with CLOVER.  After her last ad - recent admit for IV abx for cellulitis after failing outpatient keflex and bactrim- discharged last admit on vanc  - IV ceftriaxone on going, oral ceftin on DC for 7 more days  - on PO vanc for C diff BID per ID, will need to be on this INDEFINITELY     Commonly known as:  NovoLOG FlexPen  Take with each meal, based on the following scale: BG <140-take no insulin; -180- take 1 units of Novolog; -220- take 2 units; -260- take 3 units; -300- take 4 units; -340-take 5 units; BG Generic drug:  Pancrelipase (Lip-Prot-Amyl)  TAKE 2 CAPSULES BY MOUTH THREE TIMES DAILY WITH MEALS AND 1 CAPSULE WITH SNACKS     dilTIAZem HCl ER Coated Beads 120 MG Cp24  Commonly known as:  Cardizem CD  Take 1 capsule (120 mg total) by mouth daily.      D Varun Chau DO. Schedule an appointment as soon as possible for a visit in 2 days.     Specialties:  Shahida Edmondson, Internal Medicine  Contact information:  [address] [facility]  [phone]             Schedule an appointm

## 2020-09-09 NOTE — CM/SW NOTE
Plan is for discharge home today 9/9. SW notified [facility] of discharge plan and sent updated [facility] orders. SW notified Critical access hospital of discharge today via A[facility].       [phone]

## 2020-09-09 NOTE — PROGRESS NOTES
[facility] AND [facility] Infectious Disease Progress Note    Lonny Novak Patient Status:  Inpatient    1955 MRN [id number]   Location [facility] 4W/SW/SE Attending Gabby Gabriel MD   Hosp Day # 15 PCP Evette Pryor     Subjective:  Pt wit infusion CONTINUOUS, 200-3,000 Units/hr, Intravenous, Continuous  •  acetaminophen (TYLENOL) tab 650 mg, 650 mg, Oral, Q6H PRN  •  traZODone HCl (DESYREL) tab 50 mg, 50 mg, Oral, Nightly PRN  •  ondansetron HCl (ZOFRAN) injection 4 mg, 4 mg, Intravenous, Q PO clinda  -on suppressive PO vancomycin BID  3. CLOVER on CKD  -Cr improving slightly   -nephrology on consult  4. Hx of metastatic pancreatic CA  -worsening in repeat imaging  -chemo on hold  5. Ascites  -s/p paracentesis  6.   Fever and leukocytosis  -WB

## 2020-09-09 NOTE — PROGRESS NOTES
NEPHROLOGY DAILY PROGRESS NOTE       Follow up Reason: CLOVER     SUBJECTIVE:    Laying comfortably in bed still having abdominal pain.     OBJECTIVE:    Total Intake/Output:    Intake/Output Summary (Last 24 hours) at 9/9/2020 1429  Last data filed at 9/9/202 (COMPAZINE) tab 10 mg, 10 mg, Oral, Q8H PRN  Acidophilus/Pectin (PROBIOTIC) CAPS 1 capsule, 1 capsule, Oral, Daily  Normal Saline Flush 0.9 % injection 3 mL, 3 mL, Intravenous, PRN  heparin (PORCINE) drip 46352slqen/250mL infusion CONTINUOUS, 200-3,000 Uni 1,000 mg by mouth 2 (two) times daily with meals. traMADol HCl 50 MG Oral Tab, Take 1-2 tablets ( mg total) by mouth every 4 (four) hours as needed for Pain.   Continuous Blood Gluc Sensor (Na[employer] SolutionsSTYLE BILL 14 DAY SENSOR) Does not apply Misc, Use da heme/onc       #. Ascites and lower extremity edema   - s/p paracentesis 8/31, 4L removed     #.  Hypoalbuminemia  - encourage protein intake      Hilda Cagle MD  [address] [facility]  Nephrology

## 2020-09-09 NOTE — PROGRESS NOTES
[facility] AND CLINICS  Progress Note    Lianet Lesterlloyd Patient Status:  Inpatient    1955 MRN [id number]   Location [facility] 4W/SW/SE Attending Latisha Diez MD   Hosp Day # 15 PCP Debby Buckner     Subjective:    Renal function improvi 10(3)uL    Neutrophil Absolute Prelim 3.24 1.50 - 7.70 x10 (3) uL    Neutrophil Absolute 3.24 1.50 - 7.70 x10(3) uL    Lymphocyte Absolute 0.59 (L) 1.00 - 4.00 x10(3) uL    Monocyte Absolute 0.68 0.10 - 1.00 x10(3) uL    Eosinophil Absolute 0.17 0.00 - 0.7 cellulitis and CLOVER     Metastatic Adenocarcinoma  - progressive on folofirinox  - on second line with gem/abraxane--> treatment on hold due to infection and worsening CLOVER   - no treatment while inpatient   - Ca19-9 increasing, case discussed with Dr. Skylar Rice

## 2020-09-09 NOTE — PLAN OF CARE
Patient alert and oriented. Rocephin given. Given tramadol for generalized pain with relief. VSS. Right arm with CGM. Heparin drip at 12, INR recheck above 2. Magnesium replaced via IV. Isolation maintained. Regular diet, minimal appetite. Up with SBA.  [place] affect risk of falls.   - [place] fall precautions as indicated by assessment.  - Educate pt/family on patient safety including physical limitations  - Instruct pt to call for assistance with activity based on assessment  - Modify environment to reduce ri

## 2020-09-11 PROBLEM — R18.0 ASCITES, MALIGNANT: Status: ACTIVE | Noted: 2020-01-01

## 2020-09-11 PROBLEM — R52 INTRACTABLE PAIN: Status: ACTIVE | Noted: 2020-01-01

## 2020-09-11 PROBLEM — C25.9 PANCREATIC CANCER (HCC): Status: ACTIVE | Noted: 2020-01-01

## 2020-09-11 NOTE — ED INITIAL ASSESSMENT (HPI)
Patient with lower/mid abd pain \"for a while\". Was hospitalized here for two weeks for kidney failure and recently discharged. Patient currently on chemo for pancreatic cancer. Denies fever at home. Denies vomiting. Some diarrhea.

## 2020-09-12 NOTE — PROGRESS NOTES
[facility] Dosing Service  Antibiotic Dosing    Yanelis Polanco is a 60year old for whom pharmacy is dosing Vancomycin for treatment of  c dif prophylaxis. .  Other antibiotics (Not dosed by pharmacy): Allergies: has No Known Allergies.     Vital

## 2020-09-12 NOTE — ED PROVIDER NOTES
Patient Seen in: [facility] AND CLINICS 4w/sw/se      History   Patient presents with:  Abdomen/Flank Pain    Stated Complaint: Abdominal pain    HPI    60year old male with multiple medical issues including atrial fibrillation on Coumadin, portal venous thr Vitals [09/11/20 1706]   BP (!) 148/91   Pulse 97   Resp 18   Temp 98.2 °F (36.8 °C)   Temp src Oral   SpO2 94 %   O2 Device None (Room air)       Current:/85 (BP Location: Right arm)   Pulse 115   Temp (!) 101 °F (38.3 °C) (Oral)   Resp 20   Ht 190. Phosphatase 663 (*)     Albumin 2.5 (*)     A/G Ratio 0.6 (*)     All other components within normal limits   LIPASE - Abnormal; Notable for the following components:    Lipase <10 (*)     All other components within normal limits   PROTHROMBIN TIME (PT) - administration in time range)   diltiazem (CARDIZEM CD) 24 hr cap 120 mg (has no administration in time range)   Fluticasone Furoate-Vilanterol (BREO ELLIPTA) 200-25 MCG/INH inhaler 1 puff (has no administration in time range)   LORazepam (ATIVAN) tab 0.5 HCl (ZOFRAN) injection 4 mg (has no administration in time range)   Metoclopramide HCl (REGLAN) injection 10 mg (has no administration in time range)   vancomycin HCl (VANCOCIN) cap 125 mg (has no administration in time range)   acetaminophen (TYLENOL) tab

## 2020-09-12 NOTE — PROGRESS NOTES
cycloSPORINE (RESTASIS) 0.05 % ophthalmic emulsion 1 drop bid  is Non-Formulary Medication &  Auto-Substituted to CARBOXYMETHYLCELLULOSE SOD PF 1 % OP GEL bid Per P&T PROTOCOL

## 2020-09-12 NOTE — PROGRESS NOTES
[facility] Pharmacy Note:  Renal Dose Adjustment for Metoclopramide (REGLAN)    Silvia Smith has been prescribed Metoclopramide (REGLAN) 10 mg every 8 hours as needed for N/V.     Estimated Creatinine Clearance: 28.8 mL/min (A) (based on SCr of 3.1 mg/dL (H

## 2020-09-12 NOTE — ED NOTES
Patient recently admitted. Patient has hx of pancreatic cancer. Patient states he came back to the ED for increased abdominal pain. Patients last BM today.

## 2020-09-12 NOTE — ED NOTES
Orders for admission, patient is aware of plan and ready to go upstairs. Any questions, please call ED RN ZOYA SUTTON at [address]. Type of COVID test sent:   COVID Suspicion level: Low    LOC at time of transport: Alert and oriented x3.     Other

## 2020-09-12 NOTE — PLAN OF CARE
Problem: Patient Centered Care  Goal: Patient preferences are identified and integrated in the patient's plan of care  Description  Interventions:  - What would you like us to know as we care for you?   - Provide timely, complete, and accurate informatio appropriate and evaluate response  - Consider cultural and social influences on pain and pain management  - Manage/alleviate anxiety  - Utilize distraction and/or relaxation techniques  - Monitor for opioid side effects  - Notify MD/LIP if interventions un and replace protective barrier if needed  - Change diapers as needed  - Minimize the use of adhesives  Outcome: Progressing   Pt negative for Covid-19 and c-diff, poor appetite, c/o abd pain, medicated as needed, p tup with assistance to bathroom and ambul

## 2020-09-12 NOTE — PHYSICAL THERAPY NOTE
PHYSICAL THERAPY EVALUATION - INPATIENT     Room Number: 472/472-A  Evaluation Date: 9/12/2020  Type of Evaluation: Initial   Physician Order: PT Eval and Treat    Presenting Problem: pancreatic CA, intractable pain  Reason for Therapy: Mobility Dysfuncti training;Transfer training  Rehab Potential : Good  Frequency (Obs): 3-5x/week       PHYSICAL THERAPY MEDICAL/SOCIAL HISTORY     History related to current admission: intractable pain, recent admit for CLOVER; pancreatic CA.      Problem List  Principal Proble Good  Static Standing: Fair -  Dynamic Standing: Fair -    ADDITIONAL TESTS                                    NEUROLOGICAL FINDINGS                      ACTIVITY TOLERANCE  Pulse: 98  Heart Rate Source: Monitor                   O2 WALK  SPO2 on Room Air Current Status    Goal #2 Patient is able to demonstrate transfers Sit to/from Stand at assistance level: modified independent with walker - rolling     Goal #2  Current Status    Goal #3 Patient is able to ambulate 100 feet with assist device: walker -

## 2020-09-12 NOTE — RESPIRATORY THERAPY NOTE
CPAP not indicated at this time due to R/O Covid-19. Pt does use CPAP and brought own tubing with mask.

## 2020-09-12 NOTE — PLAN OF CARE
Problem: Patient Centered Care  Goal: Patient preferences are identified and integrated in the patient's plan of care  Description  Interventions:  - What would you like us to know as we care for you?  I was admitted here just recently  - Provide timely, non-pharmacological measures as appropriate and evaluate response  - Consider cultural and social influences on pain and pain management  - Manage/alleviate anxiety  - Utilize distraction and/or relaxation techniques  - Monitor for opioid side effects  - N septum area for skin breakdown and replace protective barrier if needed  - Change diapers as needed  - Minimize the use of adhesives  Outcome: Progressing   Arrived to the floor from ED. A&ox4. Forgetful at times.  Patient had fever of 101 last night with H

## 2020-09-12 NOTE — H&P
[facility] Hospitalist H&P       CC: Patient presents with:  Abdomen/Flank Pain       PCP: Bao Hernandez    History of Present Illness: Patient is a 60year old male with PMH sig for A-fib, PV thrombus, CLOVER, DM, HTN, HL, LONDON, pancreatic Ca who presents with inc 7 units; BG >420- take 8 units; MDD: 25 units daily, Disp: 23 mL, Rfl: 1  saccharomyces boulardii 250 MG Oral Cap, Take 250 mg by mouth 2 (two) times daily. , Disp: , Rfl:   CREON 62872 units Oral Cap DR Particles, TAKE 2 CAPSULES BY MOUTH THREE TIMES DAILY auscultation bilaterally. Normal effort   Chest wall:  No tenderness or deformity. Heart:  Regular rate and rhythm, S1, S2 normal, no murmur, rub or gallop appreciated   Abdomen:   Distended, non-tender. Bowel sounds hyperactive.    Extremities: Extremiti AM     Finalized by (CST): Dannielle Briggs MD on 9/12/2020 at 7:33 AM              ASSESSMENT / PLAN:   Patient is a 60year old male with PMH sig for A-fib, PV thrombus, CLOVER, DM, HTN, HL, LONDON, pancreatic Ca who presents with increased abd pain.     Abdomina questions and note understanding and agreeing with therapeutic plan as outlined    Thank Dominic Mcginnis MD    [facility] Hospitalist  Answering Service number: [phone]

## 2020-09-12 NOTE — CONSULTS
[facility] AND [facility]  Report of Consultation    [facility] Patient Status:  Inpatient    1955 MRN [id number]   Location [facility] 4W/SW/SE Attending Nubia Watts MD   Hosp Day # 1 PCP Corbin Donald     Reason for Consultation:  Tiffanie Parker injection 5 mg, 5 mg, Intravenous, Q8H PRN  •  Cefuroxime Axetil (CEFTIN) tab 500 mg, 500 mg, Oral, Daily  •  oxyCODONE HCl (OXY-IR) cap/tab 10 mg, 10 mg, Oral, Q4H PRN  •  Albuterol Sulfate  (90 Base) MCG/ACT inhaler 2 puff, 2 puff, Inhalation, Q6H 4 mg, Intravenous, Q6H PRN  •  vancomycin HCl (VANCOCIN) cap 125 mg, 125 mg, Oral, Daily    Review of Systems:  A 10-point review of systems was done with pertinent positives and negatives per the HPI.     Physical Exam:   Blood pressure 129/72, pulse 98, t Finalized by (CST): Ben Mayers MD on 9/12/2020 at 7:33 AM              Impression and Plan:    Mr. Ml Adamson is a 60year old gentleman with a history of atrial fibrillation, DMII, hypertension, dyslipidemia and metastatic pancreas cancer admitted w

## 2020-09-13 NOTE — CONSULTS
[facility]    Report of Consultation    Mallikaadis Pedraza Patient Status:  Inpatient    1955 MRN [id number]   Location [facility] 4W/SW/SE Attending Silvio Hodgkins, MD   Hosp Day # 1 PCP Lexy Hoskins     Date of consult: 5 cap/tab 10 mg, 10 mg, Oral, Q4H PRN  •  Albuterol Sulfate  (90 Base) MCG/ACT inhaler 2 puff, 2 puff, Inhalation, Q6H PRN  •  pancrelipase (Lip-Prot-Amyl) (ZENPEP) DR particles cap 40,000 Units, 40,000 Units, Oral, TID CC and HS  •  carboxymethylcell file.        PHYSICAL EXAM:     Vital Signs: /72 (BP Location: Right arm)   Pulse 98   Temp 98.6 °F (37 °C) (Oral)   Resp 18   Ht 6' 3\" (1.905 m)   Wt 209 lb 14.4 oz (95.2 kg)   SpO2 99%   BMI 26.24 kg/m²   Temp (24hrs), Av.9 °F (37.2 °C), Min:9 09/12/2020    BAPERCENT 0.1 09/12/2020    NE 8.00 (H) 09/12/2020    LYMABS 0.32 (L) 09/12/2020    MOABSO 0.34 09/12/2020    EOABSO 0.05 09/12/2020    BAABSO 0.01 09/12/2020     Lab Results   Component Value Date    CREUR 184.00 09/12/2020     Lab Results bicarb tabs.      Pancreatic adenocarcinoma  - Most recently on gemcitabine and abraxane  - per heme/onc       Ascites and lower extremity edema   - s/p paracentesis 8/31, 4L removed   - ok from a renal standpoint to proceed w para if needed for comfort , w

## 2020-09-13 NOTE — PLAN OF CARE
Patient is alert and oriented. Vital signs stable. Remote tele. No calls. Tolerating carb controlled diet but poor appetite. Accucheks AC/HS. Oxy for pain control. Denies nausea. Ambulating SBA. Voiding. No BM's not passing gas. Saline locked.  JACEY hose for Progressing     Problem: PAIN - ADULT  Goal: Verbalizes/displays adequate comfort level or patient's stated pain goal  Description  INTERVENTIONS:  - Encourage pt to monitor pain and request assistance  - Assess pain using appropriate pain scale  - Adminis ordered  Outcome: Progressing     Problem: SKIN INTEGRITY  Goal: Skin integrity remains intact  Description  Interventions:  - Assess for areas of redness and/or skin breakdown  - Assess vascular sites hourly  - Change oxygen saturation probe minimum once within normal limits  Description  INTERVENTIONS:  - Monitor labs and rhythm and assess patient for signs and symptoms of electrolyte imbalances  - Administer electrolyte replacement as ordered  - Monitor response to electrolyte replacements, including rhy

## 2020-09-13 NOTE — PLAN OF CARE
Problem: Patient Centered Care  Goal: Patient preferences are identified and integrated in the patient's plan of care  Description  Interventions:  - What would you like us to know as we care for you?  Pt lives by himself with supportive family living in and evaluate response  - Implement non-pharmacological measures as appropriate and evaluate response  - Consider cultural and social influences on pain and pain management  - Manage/alleviate anxiety  - Utilize distraction and/or relaxation techniques  - M If on oxygen support, assess nasal septum area for skin breakdown and replace protective barrier if needed  - Change diapers as needed  - Minimize the use of adhesives  Outcome: Progressing     Problem: GASTROINTESTINAL - ADULT  Goal: Minimal or absence of restriction as ordered  - Instruct patient on fluid and nutrition restrictions as appropriate  Outcome: Progressing   Pt weak, up to bathroom with assistance, poor balance, hgb 7.6 today and INR=3.17, Dr Martin Funk and Dr Shana Rubin aware.  Dr Martin Funk notified regard

## 2020-09-13 NOTE — PROGRESS NOTES
[facility] Hospitalist Progress Note     CC: Hospital Follow up    PCP: Kena Aceves       Assessment/Plan:     Principal Problem:    Intractable pain  Active Problems:    Malignant neoplasm of pancreas, unspecified location of malignancy [facility])    Pancreatic can probiotic  - c/diff negative       FN:  - IVF: complete   - Diet: DM diet      DVT Prophy: warfarin   Atrophy: PT/OT, home with home health   Lines: Piv     Dispo: pending clinical course with pain management     Code status: Full    Questions/concerns wer Extremities normal, atraumatic, no cyanosis 1+ edema b/l. Skin: Skin color, texture, turgor normal. Mild redness b/l LE.     Neurologic: Normal strength, no focal deficit appreciated           Data Review:       Labs:     Recent Labs   Lab 09/11/20  1806 120 mg Oral Daily   • Montelukast Sodium  10 mg Oral Nightly   • Pantoprazole Sodium  40 mg Oral QAM AC   • Acidophilus/Pectin  1 capsule Oral BID   • sodium bicarbonate  650 mg Oral TID   • docusate sodium  100 mg Oral BID   • vancomycin HCl  125 mg Oral

## 2020-09-13 NOTE — PROGRESS NOTES
[facility] AND CLINICS  Progress Note    Lonny Novak Patient Status:  Inpatient    1955 MRN [id number]   Location [facility] 4W/SW/SE Attending Christiano Perez MD   Hosp Day # 2 PCP Evette Pryor     Subjective:  No events overnight.  Oxyc Fever  -Non-neutropenic  -Unclear if any infection  -May be due to Gemcitabine  -Watch culture data  -Observe    -OK for discharge at any point; will need close outpatient follow up if discharged. I will continue to follow while inpatient.  Please do not he

## 2020-09-14 NOTE — PROGRESS NOTES
[facility]    Nephrology Progress Note    Claudell Counts Attending:  Elvin Arteaga MD     Cc: Alban Martínez    SUBJECTIVE     Feeling better, still distended    PHYSICAL EXAM   Vital signs: /81 (BP Location: Right arm)   Pulse 108   Temp mg, 0.5 mg, Oral, BID PRN  Montelukast Sodium (SINGULAIR) tab 10 mg, 10 mg, Oral, Nightly  Pantoprazole Sodium (PROTONIX) EC tab 40 mg, 40 mg, Oral, QAM AC  Acidophilus/Pectin (PROBIOTIC) CAPS 1 capsule, 1 capsule, Oral, BID  sodium bicarbonate tab 650 mg, CHEST AP PORTABLE  (CPT=71045)    TIME: 0111 hours.            COMPARISON: [facility], CT ABDOMEN+PELVIS (OIO=[id number]),     8/28/2020, 3:23 PM.  [facility], XR CHEST PA + LAT CHEST     (AYZ=70031), 9/03/2020, 1:37 PM.         I (9/3/2020).  Now stable there and slightly improved to 2.83 today  -- urine na 13  -- UA w bacteria, defer need for abx to primary  -- no indication for dialysis and would be poor candidate anyway  -- ok to proceed w para if needed for comfort, would give a

## 2020-09-14 NOTE — PLAN OF CARE
Problem: Patient Centered Care  Goal: Patient preferences are identified and integrated in the patient's plan of care  Description  Interventions:  - What would you like us to know as we care for you?  My goal is to go home soon, my son in law will be hel type and severity of pain and evaluate response  - Implement non-pharmacological measures as appropriate and evaluate response  - Consider cultural and social influences on pain and pain management  - Manage/alleviate anxiety  - Utilize distraction and/or ordered and per policy  - If on oxygen support, assess nasal septum area for skin breakdown and replace protective barrier if needed  - Change diapers as needed  - Minimize the use of adhesives  Outcome: Progressing     Problem: GASTROINTESTINAL - ADULT  G appropriate  - Fluid restriction as ordered  - Instruct patient on fluid and nutrition restrictions as appropriate  Outcome: Progressing     Patient is alert and oriented x4, forgetful at times. He is ambulating with one person assistance and walker.   Tanner Donaldson

## 2020-09-14 NOTE — OCCUPATIONAL THERAPY NOTE
OCCUPATIONAL THERAPY EVALUATION - INPATIENT     Room Number: 472/472-A  Evaluation Date: 9/14/2020  Type of Evaluation: Initial  Presenting Problem: cancer related pain    Physician Order: IP Consult to Occupational Therapy  Reason for Therapy: ADL/IADL Dy walker \". Pt also reported he never showers without spv from family to maximize safety. Discussed with pt recommendations for home --discussed having 24 hr spv and recommendation of home health OT. Pt agreeable.  Due to distractibility, pt would benefi riser  Shower/Tub and Equipment: Walk-in shower; Shower chair             Drives: No         Prior Level of Luna: amb with RW, indep with ADLs, supv/assist for IADLs.  Kids grocery shop for him    Pt unsure if he will stay at his house or live with h Standing: fair  Dynamic Standing: fair    FUNCTIONAL ADL ASSESSMENT  Grooming: set-up in sitting   Feeding: set-up in sitting   Bathing: NT  Toileting: NT  Upper Extremity Dressing: NT  Lower Extremity Dressing: SBA to manage socks using figure four techni

## 2020-09-14 NOTE — PROGRESS NOTES
LEELA MCNEILL [facility]        SUBJECTIVE     To go for paracentesis and possible pleurx catheter placement today       PHYSICAL EXAM   Vital signs: /74 (BP Location: Right arm)   Pulse 111   Temp 99.1 °F (37.3 °C) (Oral)   Resp 18   Ht 6' 3\" (1. Sodium (PROTONIX) EC tab 40 mg, 40 mg, Oral, QAM AC  Acidophilus/Pectin (PROBIOTIC) CAPS 1 capsule, 1 capsule, Oral, BID  sodium bicarbonate tab 650 mg, 650 mg, Oral, TID  traMADol HCl (ULTRAM) tab 50 mg, 50 mg, Oral, Q12H PRN  HYDROmorphone HCl (DILAUDID) 154 09/14/2020       IMAGING   ARADIOLOGY RESULT SCAN   Final Result     XR CHEST AP PORTABLE  (CPT=71045)   Final Result    PROCEDURE: XR CHEST AP PORTABLE  (CPT=71045)    TIME: 0111 hours.            COMPARISON: [facility], CT ABDOMEN+PELV Vancomycin nephrotoxicity and hypovolemia in the setting of N/V/D.  Now likely has developed ATN.    -- baseline creatinine 0.77 as of 8/20/2020  -- Creatinine peaked of 4.75 (9/3/2020), creatinine improved and stable at 2.85   -- UA w bacteria, defer need

## 2020-09-14 NOTE — PLAN OF CARE
Problem: Patient Centered Care  Goal: Patient preferences are identified and integrated in the patient's plan of care  Description  Interventions:  - What would you like us to know as we care for you? I like to be involved in my medication plan of care. response  - Implement non-pharmacological measures as appropriate and evaluate response  - Consider cultural and social influences on pain and pain management  - Manage/alleviate anxiety  - Utilize distraction and/or relaxation techniques  - Monitor for op support, assess nasal septum area for skin breakdown and replace protective barrier if needed  - Change diapers as needed  - Minimize the use of adhesives  Outcome: Progressing     Problem: GASTROINTESTINAL - ADULT  Goal: Minimal or absence of nausea and v ordered  - Instruct patient on fluid and nutrition restrictions as appropriate  Outcome: Progressing     Patient A&Ox3. VSS. Patient has chronic abdominal pain, controlled by PRN medications. Assisted to bathroom x1 with walker.  Call light in reach and saf

## 2020-09-14 NOTE — CM/SW NOTE
09/14/20 1500   CM/SW Referral Data   Referral Source    Reason for Referral Discharge planning;Readmission   Informant Patient   Readmission Assessment   Factors that patient feels contributed to this readmission Other (only choose if nothi pain, (9/10) and distended abdomen. Pt states he called oncology office and notified them of increased pain and poor appetite whom informed him to come to ER for pain control. -HGB 6.9 today.  -Pt received 1 unit PRBC's today.     DC planning:  -Plurex d

## 2020-09-14 NOTE — PROGRESS NOTES
[facility] Hospitalist Progress Note     CC: Hospital Follow up    PCP: Jacky Bettencourt       Assessment/Plan:   60year old male with PMH sig for A-fib, PV thrombus, CLOVER, DM, HTN, HL, LONDON, pancreatic Ca who presented with increased abd pain.     Abdominal distensi Atrophy: PT/OT, home with home health      Dispo: pending    Code status: Full    Questions/concerns were discussed with patient.     Clifm Bosworth DO  [facility] Hospitalist   Available via Text page  [facility] Answering service [phone]         Subjective:     Jin Koenig 26.0  26.0 25.0 25.0  25.0       Recent Labs   Lab 09/10/20  0953 09/11/20  1802 09/12/20  0655 09/13/20  0659 09/13/20  0850 09/14/20  0643   ALT 31 38  --  35 36 27   AST 48* 65*  --  50* 50* 34   ALB 3.1* 2.5* 2.2* 1.9*  1.9* 2.1* 2.0*  2.0*         Jessie

## 2020-09-14 NOTE — PROGRESS NOTES
[facility] AND CLINICS  Progress Note    Chantale Griffin Patient Status:  Inpatient    1955 MRN [id number]   Location [facility] 4W/SW/SE Attending Myra Pierce MD   Hosp Day # 3 PCP Mateo Soria     Subjective:  No events overnight.  Pain Plan paracentesis by IR in house, may consider pleurx    4. CKD  -Per nephrology     5. Fever  -Non-neutropenic  -Unclear if any infection  -May be due to Gemcitabine  - no further fevers     For paracentesis today.  Case discussed with team.     Magdy Alfredo

## 2020-09-14 NOTE — PROGRESS NOTES
[facility] Pharmacy Note:  Renal Adjustment for cefuroxime (CEFTIN)    Mellissa Dewitt is a 60year old patient who has been prescribed cefuroxime (CEFTIN) 500 mg every 24 hrs.   CrCl is estimated creatinine clearance is 31.3 mL/min (A) (based on SCr of 2.85 m

## 2020-09-15 NOTE — PROGRESS NOTES
[facility] Hospitalist Progress Note     CC: Hospital Follow up    PCP: Hu Bhagat       Assessment/Plan:   60year old male with PMH sig for A-fib, PV thrombus, CLOVER, DM, HTN, HL, LONDON, pancreatic Ca who presented with increased abd pain.     Abdominal distensi patient. Calista Mejia DO  [facility] Hospitalist   Available via Text page  [facility] Answering service [phone]         Subjective:     Doing ok. No acute complaints. Abdomen feels full he states. No fevers.      OBJECTIVE:    Blood pressure 133/88, pulse 105, te 25.0 25.0       Recent Labs   Lab 09/10/20  0953 09/11/20  1802 09/12/20  0655 09/13/20  0659 09/13/20  0850 09/14/20  0643 09/15/20  0556   ALT 31 38  --  35 36 27  --    AST 48* 65*  --  50* 50* 34  --    ALB 3.1* 2.5* 2.2* 1.9*  1.9* 2.1* 2.0*  2.0* 1.

## 2020-09-15 NOTE — PAYOR COMM NOTE
--------------  ADMISSION REVIEW     Payor: [employer] POS/SHIVANI  Subscriber #:  [id number]  Authorization Number: [id number]    Admit date: 9/11/20  Admit time: 2312       Admitting Physician: Zakai Vasques DO  Attending Physician:  DO Trini Venegas Performed by Nely Locke MD at [address] [place] [address]                Family history reviewed with patient/caregiver and is not pertinent to presenting problem.     Social History    Tobacco Use      Smoking status: Never Smoker      Smokeless tobacco Musculoskeletal: Normal range of motion. General: No tenderness. Skin:     General: Skin is warm and dry. Findings: No rash. Neurological:      Mental Status: He is alert and oriented to person, place, and time.       Sensory: No sensory de CREATININE, URINE, RANDOM   RENAL FUNCTION PANEL   MAGNESIUM   URINALYSIS WITH CULTURE REFLEX   PROTHROMBIN TIME (PT)   CBC WITH DIFFERENTIAL WITH PLATELET   RAINBOW DRAW BLUE   RAINBOW DRAW LAVENDER   RAINBOW DRAW LIGHT GREEN   RAINBOW DRAW [facility] Or   dextrose 50 % injection 50 mL (has no administration in time range)     Or   glucose (DEX4) oral liquid 30 g (has no administration in time range)     Or   Glucose-Vitamin C (DEX-4) chewable tab 8 tablet (has no administration in time range)   [facility] Present on Admission  Date Reviewed: 8/25/2020          ICD-10-CM Noted POA    * (Principal) Intractable pain R52 9/11/2020 Unknown    Ascites, malignant R18.0 9/11/2020     Malignant neoplasm of pancreas, unspecified location of malignancy ([facility] [address] C25. Warfarin Sodium 10 MG Oral Tab, Take 1 tablet (10 mg total) by mouth nightly., Disp: 30 tablet, Rfl: 0  sodium bicarbonate 650 MG Oral Tab, Take 1 tablet (650 mg total) by mouth 3 (three) times daily. , Disp: 90 tablet, Rfl: 0  Cefuroxime Axetil 500 MG Oral No family history on file. Review of Systems  Comprehensive ROS reviewed and negative except for what's stated above.      OBJECTIVE:  /80 (BP Location: Right arm)   Pulse 115   Temp 97.2 °F (36.2 °C) (Oral)   Resp 18   Ht 6' 3\" (1.905 m)   Wt 209 MG 1.5* 1.5* 1.3* 1.4*  --   --  1.6   PHOS  --   --   --  4.3  --   --  4.1       Recent Labs   Lab 09/09/20  0502 09/10/20  0953 09/11/20  1802 09/12/20  0655   ALT  --  31 38  --    AST  --  48* 65*  --    ALB 2.1* 3.1* 2.5* 2.2*       No results for in Portal vein thrombus  - AC with warfarin  - INR 3.4  - will hold warfarin dose and repeat INR       A-fib  - AC with warfarin  - INR 3.4  - will hold warfarin dose and repeat INR      DM  - SSI at home     GERD  - PPI      LONDON  - CPAP qhs    H/o of C-diff Fannyiza Calhoun is quite verbose so getting a concise history from him was challenging. However, based upon what I could gather he resumed palliative chemotherapy this week with gem/abraxane. It has been delayed due to pneumonia and abdominal pains.  He was on trama •  diltiazem (CARDIZEM CD) 24 hr cap 120 mg, 120 mg, Oral, Daily  •  Fluticasone Furoate-Vilanterol (BREO ELLIPTA) 200-25 MCG/INH inhaler 1 puff, 1 puff, Inhalation, Daily  •  LORazepam (ATIVAN) tab 0.5 mg, 0.5 mg, Oral, BID PRN  •  Montelukast Sodium (SIN General: Patient is alert and oriented x 3, not in acute distress.   Vital Signs: /72 (BP Location: Right arm)   Pulse 98   Temp 98.6 °F (37 °C) (Oral)   Resp 18   Ht 1.905 m (6' 3\")   Wt 95.2 kg (209 lb 14.4 oz)   SpO2 99%   BMI 26.24 kg/m²   HEENT: -On palliative chemotherapy with gemcitabine and Abraxane  -Chemo given last on 9/10  -No active therapy as inpatient     2.  Abdominal pain  -Due to malignant ascites  -Consider paracentesis next week  -IR not in house over weekend and INR elevated  -Will Deya Roa is a a(n) 60year old male w A-fib, PV thrombus, CLOVER, DM, HTN, HL, LONDON, pancreatic Ca who presents with increased abd pain.  He was seen by his oncologist 2 days ago and his pain medications were increased.  His increased abd girth was a •  carboxymethylcellulose sod PF 1 % ophthalmic gel 1 drop, 1 drop, Both Eyes, BID  •  diltiazem (CARDIZEM CD) 24 hr cap 120 mg, 120 mg, Oral, Daily  •  Fluticasone Furoate-Vilanterol (BREO ELLIPTA) 200-25 MCG/INH inhaler 1 puff, 1 puff, Inhalation, Daily Temp (24hrs), Av.9 °F (37.2 °C), Min:97.2 °F (36.2 °C), Max:101 °F (38.3 °C)        Intake/Output Summary (Last 24 hours) at 2020  Last data filed at 2020 1500  Gross per 24 hour   Intake 1510 ml   Output 150 ml   Net 1360 ml      Wt Inell Fear   BAABSO 0.01 09/12/2020            Lab Results   Component Value Date     CREUR 184.00 09/12/2020            Lab Results   Component Value Date     COLORUR Yellow 09/12/2020     CLARITY Hazy (A) 09/12/2020     SPECGRAVITY 1.018 09/12/2020     Nader Hippo Ascites and lower extremity edema   - s/p paracentesis 8/31, 4L removed   - ok from a renal standpoint to proceed w para if needed for comfort , would give albumin w it      Anemia  -- monitor hgb      D/w RN and Dr. Alfie Harrison     Thank you for allowing me to p Pancreatic ca stage 4  - stage IV sp 4 c FOLFIRINOX with disease progression now on gem/ abraxane  - onc on consult  - last chemo 9/10  - continue pancrelipase      Pancytopenia  - secondary to chemo/malignancy  - anemia stable at 7.5,   - will trend and t 08/27/20 1640 : 218 lb (98.9 kg)        Exam   General:  Alert, no distress, cachexia   Head:  Normocephalic, without obvious abnormality, atraumatic. Eyes:  Sclera anicteric, No conjunctival pallor, EOMs intact. Nose: Nares normal. Septum midline.  Mu CONCLUSION:  1. Suboptimal inspiration. Patchy bibasilar airspace disease left more than right suggesting bibasilar pneumonia and or atelectasis. Follow-up studies are advised.  The examination was read on call by [facility] radiology services with a similar - spoke to IR this AM regarding pleurX cath placement for recurrent ascites  - will likely be scheduled for tomorrow   - hold coumadin today   Addendum: to do Pleurx INR needs to be 1.5. will allow INR to drift down.  Will repeat in AM. Suspect this may christoph Blood pressure 110/80, pulse 105, temperature 98.9 °F (37.2 °C), temperature source Oral, resp.  rate 19, height 6' 3\" (1.905 m), weight 209 lb 14.4 oz (95.2 kg), SpO2 99 %.     Temp:  [98 °F (36.7 °C)-100 °F (37.8 °C)] 98.9 °F (37.2 °C)  Pulse:  [105-151] Result Date: 9/12/2020  CONCLUSION:  1. Suboptimal inspiration. Patchy bibasilar airspace disease left more than right suggesting bibasilar pneumonia and or atelectasis. Follow-up studies are advised.  The examination was read on call by [facility] radiology 9/14/2020 2013 Given (none) Topical Chi Koenig RN      Pt's Own: Restasis (Cyclosporine) ophth emulsion 0.05%     Date Action Dose Route User    9/14/2020 2012 Given 1 drop Both Eyes Chi Koenig RN      Insulin Aspart Pen (NOVOLOG) 100 UNIT/ML fl

## 2020-09-15 NOTE — PLAN OF CARE
Patient is alert and oriented times 4, forgetful at times, up with one person assist and a walker. Dyspnea with exertion noted. Remote tele in place with one call over night regarding HR in 130s- Pt at the time was up in the bathroom, no calls since.  Oxyco anxiety  - Utilize distraction and/or relaxation techniques  - Monitor for opioid side effects  - Notify MD/LIP if interventions unsuccessful or patient reports new pain  - Anticipate increased pain with activity and pre-medicate as appropriate  Outcome: P Problem: GASTROINTESTINAL - ADULT  Goal: Minimal or absence of nausea and vomiting  Description  INTERVENTIONS:  - Maintain adequate hydration with IV or PO as ordered and tolerated  - Nasogastric tube to low intermittent suction as ordered  - Evaluate e

## 2020-09-15 NOTE — CM/SW NOTE
1: CM spoke with daughter Homero Juares, CM explained that correction care is generally an out of pocket cost and not covered by ins. Caregiver list was emailed to daughter   ( [email or web address]). Ellen to talk to her sisters regarding above and OOP costs.

## 2020-09-15 NOTE — PHYSICAL THERAPY NOTE
PHYSICAL THERAPY TREATMENT NOTE - INPATIENT     Room Number: 472/472-A       Presenting Problem: pancreatic CA, intractable pain    Problem List  Principal Problem:    Intractable pain  Active Problems:    Malignant neoplasm of pancreas, unspecified locati mechanics; Relaxation;Repositioning    BALANCE                                                                                                                     Static Sitting: Good  Dynamic Sitting: Fair +           Static Standing: Fair -  Dynamic Stand Goal #2  Current Status CGA/SBA with the RW   Goal #3 Patient is able to ambulate 100 feet with assist device: walker - rolling at assistance level: modified independent   Goal #3   Current Status 250' with the RW CGA/SBA with sitting rest break.     Goal

## 2020-09-15 NOTE — PROGRESS NOTES
[facility]        SUBJECTIVE     INR still to high for paracentesis / pleurx catheter  No complaints today      PHYSICAL EXAM   Vital signs: /88 (BP Location: Right arm)   Pulse 105   Temp 98.7 °F (37.1 °C) (Oral)   Resp 20   Ht 6' 3\ PRN  Montelukast Sodium (SINGULAIR) tab 10 mg, 10 mg, Oral, Nightly  Pantoprazole Sodium (PROTONIX) EC tab 40 mg, 40 mg, Oral, QAM AC  Acidophilus/Pectin (PROBIOTIC) CAPS 1 capsule, 1 capsule, Oral, BID  sodium bicarbonate tab 650 mg, 650 mg, Oral, TID  tr CHEST     (CPT=71046), 9/03/2020, 1:37 PM.         INDICATIONS: HR sustaining at 115. History of abdomen pain and pancreatic     cancer. TECHNIQUE:   Single view. Lordotic positioning was utilized.          FINDINGS:     CARDIAC/VASC: Normal.  No c if needed for comfort, would give albumin with paracentesis      Acidosis  -- due to diarrhea and kavon  -- continue bicarb tabs.       Pancreatic adenocarcinoma  - Most recently on gemcitabine and abraxane  - per heme/onc       Ascites and lower extremity ed

## 2020-09-16 NOTE — PROGRESS NOTES
[facility] AND CLINICS  Progress Note    Naomi Jones Patient Status:  Inpatient    1955 MRN [id number]   Location [facility] 4W/SW/SE Attending Linus Kimbrough MD   Hosp Day # 5 PCP Sarath Brown     Subjective:  INR 1.8 today, going for

## 2020-09-16 NOTE — PLAN OF CARE
Pt oriented x4 but can be forgetful and confused at times. Upx1 walker, ambulating in room. Pt had pleurex placed today, RLQ dressing CDI. 9L removed.  Daughter Luis [place]ale at bedside post procedure and reviewed instructions with Val Bunch NP.   AC/HS accucheck monitor pain level  - See additional Care Plan goals for specific interventions   Outcome: Progressing     Problem: PAIN - ADULT  Goal: Verbalizes/displays adequate comfort level or patient's stated pain goal  Description  INTERVENTIONS:  - Encourage pt to development of fixed postural patterns  - Promote flexed body  - Consult OT/PT as ordered  Outcome: Progressing     Problem: SKIN INTEGRITY  Goal: Skin integrity remains intact  Description  Interventions:  - Assess for areas of redness and/or skin breakdo Problem: METABOLIC/FLUID AND ELECTROLYTES - ADULT  Goal: Electrolytes maintained within normal limits  Description  INTERVENTIONS:  - Monitor labs and rhythm and assess patient for signs and symptoms of electrolyte imbalances  - Administer electrolyte re

## 2020-09-16 NOTE — PROCEDURES
[facility] HOSP - [facility]  Procedure Note    Sina Cava Patient Status:  Inpatient    1955 MRN [id number]   Location [facility] Attending Bell Bejarano, [address] Day # 5 PCP Marcos Jacobs     Procedure: Shameka Rodriguez

## 2020-09-16 NOTE — PAYOR COMM NOTE
--------------  CONTINUED STAY REVIEW-------REQUESTING ADDITIONAL DAY 9/15      Payor: Jessica SPEARS/SHIVANI  Subscriber #:  [id number]  Authorization Number: [id number]    Admit date: 9/11/20  Admit time: 2312    Admitting Physician: DO Liz Taalvera - secondary to chemo/malignancy  - gave 1 unit of PRBC on 9/14     Portal vein thrombus  - on coumadin, but holding for now given plan for pleurx      A-fib  - hold coumadin      DM  - SSI at home     GERD  - PPI      LONDON  - CPAP qhs     H/o of C-diff  - o MCH 31.3 30.6 31.4  --    MCHC 33.5 32.9 33.7  --    RDW 18.9* 18.8* 18.9*  --    NEPRELIM 8.00* 12.74* 12.83*  --    WBC 8.8 13.4* 13.4*  --    PLT 90.0* 91.0* 70.0*  --                   Recent Labs   Lab 09/13/20  0850 09/14/20  0643 09/15/20  0556    Acidophilus/Pectin (PROBIOTIC) CAPS 1 capsule     Date Action Dose Route User    9/16/2020 0908 Given 1 capsule Oral Pollo Jacobs RN    9/15/2020 2023 Given 1 capsule Oral Shahab Solano RN    9/15/2020 1043 Given 1 capsule Oral Joelle Vidal RN 9/16/2020 0914 Given 2 puff Inhalation Rufina Hollingsworth RN    9/15/2020 2100 Given 2 puff Inhalation Paz Arriaga RN    9/15/2020 1047 Given 2 puff Inhalation Vanessa Cuenca, RN      Montelukast Sodium (SINGULAIR) tab 10 mg     Date Action Dose Rou

## 2020-09-16 NOTE — CM/SW NOTE
CM made aware this am that pt will have abdominal plurex catheter placed today in IR. Plan is possible home after placement if pt and family are able to be taught today.       [facility]  -Will need [facility] orders and to include plurex drai

## 2020-09-16 NOTE — IVS NOTE
Patient tolerated procedure well. 8.2 liters of fluid out of Pleurx tube and clamped. Clarita Jesus APN here to teach daughter about pleurx catheter care. VSS. Patient has no co's pain. Report called to January Vasques RN.  Patient transported to floor pe

## 2020-09-16 NOTE — PLAN OF CARE
Plan of care reviewed with patient and his daughter, Eliza Gooden. Patient forgetful at times. Oxy given for pain management. Patient up in chair. Has low appetite- able to tolerate small meals. Safety measures in place and call light within reach.      Pro goal  Description  INTERVENTIONS:  - Encourage pt to monitor pain and request assistance  - Assess pain using appropriate pain scale  - Administer analgesics based on type and severity of pain and evaluate response  - Implement non-pharmacological measures intact  Description  Interventions:  - Assess for areas of redness and/or skin breakdown  - Assess vascular sites hourly  - Change oxygen saturation probe minimum once per shift  - Provide humidity as ordered and per policy  - If on oxygen support, assess patient for signs and symptoms of electrolyte imbalances  - Administer electrolyte replacement as ordered  - Monitor response to electrolyte replacements, including rhythm and repeat lab results as appropriate  - Fluid restriction as ordered  - Instruct pa

## 2020-09-16 NOTE — PRE-SEDATION ASSESSMENT
[place] [facility]  IR Pre-Procedure Sedation Assessment    History of snoring or sleep or apnea?    No    History of previous problems with anesthesia or sedation  No    Physical Findings:  Neck: nl ROM  CV: RRR  PULM: normal respiratory rate/effor

## 2020-09-16 NOTE — PLAN OF CARE
Oxy IR prn for abdominal pain. Re-orientation needed frequently, more forgetful. Accuchecks per orders. Repositioned as tolerated. Pivot to rolling chair. Safety plan in place, calling appropriately. Plan is awaiting INR to decrease for pleurex placement. Problem: SAFETY ADULT - FALL  Goal: Free from fall injury  Description  INTERVENTIONS:  - Assess pt frequently for physical needs  - Identify cognitive and physical deficits and behaviors that affect risk of falls.   - [place] fall precautions as indica

## 2020-09-16 NOTE — PROGRESS NOTES
[facility] Hospitalist Progress Note     CC: Hospital Follow up    PCP: Jen Hernandez       Assessment/Plan:   60year old male with PMH sig for A-fib, PV thrombus, CLOVER, DM, HTN, HL, LONDON, pancreatic Ca who presented with increased abd pain.     Recurrent Ascites resp. rate 20, height 6' 3\" (1.905 m), weight 209 lb 14.4 oz (95.2 kg), SpO2 95 %.     Temp:  [98.9 °F (37.2 °C)-100 °F (37.8 °C)] 100 °F (37.8 °C)  Pulse:  [] 105  Resp:  [20] 20  BP: (116-124)/(71-81) 116/81      Intake/Output:    Intake/Output Sum Imaging:  No results found.       Meds:     • Potassium Chloride ER  20 mEq Oral Once   • Cefuroxime Axetil  500 mg Oral 2 times per day   • Insulin Aspart Pen  1-5 Units Subcutaneous TID CC   • cycloSPORINE  1 drop Both Eyes BID   • Mometasone Furo

## 2020-09-16 NOTE — PAYOR COMM NOTE
--------------  CONTINUED STAY REVIEW-------REQUESTING ADDITIONAL DAY 9/16      Payor: Danita KWAN  Subscriber #:  [id number]  Authorization Number: [id number]    Admit date: 9/11/20  Admit time: 2312    Admitting Physician: DO Liz Allen DVT Prophy: warfarin   PT/OT, home with home health      Dispo: DC possibly today if he receives teaching for drain, otherwise can hold and go tomorrow if further education needed about using his PleurX catheter     Code status: Full     Diannar Christiana LUCERO  DM GFRAA 26*  26* 25* 26*   GFRNAA 22*  22* 22* 22*   CA 8.1*  8.1* 8.2* 8.0*   *  134* 135* 133*   K 3.9  3.9 4.0 3.8     101 100 99   CO2 25.0  25.0 25.0 25.0                    Recent Labs   Lab 09/10/20  0953 09/11/20  1802   09/13/20  0659 09 9/16/2020 0908 Given 500 mg Oral Tucker Merrill RN    9/15/2020 2023 Given 500 mg Oral Josette Halsted, RN      clotrimazole-betamethasone Misha Duncan) cream     Date Action Dose Route User    9/16/2020 7813 Given (none) Topical Tucker Merrill RN 9/16/2020 0916 Given 40 mg Oral Yovany Ricci RN      sodium bicarbonate tab 650 mg     Date Action Dose Route User    9/16/2020 0908 Given 650 mg Oral Yovany Ricci RN    9/16/2020 0000 Given 650 mg Oral Lázaro Miller RN    9/15/2020 1839 Tucker Leger

## 2020-09-16 NOTE — PROGRESS NOTES
[facility]        SUBJECTIVE     Resting comfortably in bed   Plan for pleurx catheter later today        PHYSICAL EXAM   Vital signs: /81 (BP Location: Right arm)   Pulse 105   Temp 100 °F (37.8 °C) (Oral)   Resp 20   Ht 6' 3\" (1.90 10 mg, 10 mg, Oral, Nightly  Pantoprazole Sodium (PROTONIX) EC tab 40 mg, 40 mg, Oral, QAM AC  Acidophilus/Pectin (PROBIOTIC) CAPS 1 capsule, 1 capsule, Oral, BID  sodium bicarbonate tab 650 mg, 650 mg, Oral, TID  traMADol HCl (ULTRAM) tab 50 mg, 50 mg, Or sustaining at 115. History of abdomen pain and pancreatic     cancer. TECHNIQUE:   Single view. Lordotic positioning was utilized. FINDINGS:     CARDIAC/VASC: Normal.  No cardiac silhouette abnormality or cardiomegaly.       Unremarkable pu    Acidosis  -- stable   -- continue bicarb tabs.       Pancreatic adenocarcinoma  - Most recently on gemcitabine and abraxane  - per heme/onc       Ascites and lower extremity edema   - ok from a renal standpoint to proceed w para if needed for comfort ,

## 2020-09-17 NOTE — PROGRESS NOTES
CBC drawn as ordered, questionable dilution due to significant change in WBC and platelet count. Re-drawn, YAW Matt called notified of redraw results without much change. Order to confirm with peripheral draw.  Results called to YAW Matt of pe

## 2020-09-17 NOTE — PROGRESS NOTES
[facility]  Progress Note    Lonny Novak Patient Status:  Inpatient    1955 MRN [id number]   Location [facility] 4W/SW/SE Attending Christiano Perez MD   Hosp Day # 6 PCP Evette Pryor     Subjective:  S/p pleurx yesterday

## 2020-09-17 NOTE — DISCHARGE SUMMARY
General Medicine Discharge Summary     Patient ID:  Autumn Gonzalez  60year old  5/20/1955    Admit date: 9/11/2020    Discharge date and time: 9/17/20     Attending Physician: Boubacar Guerin DO baseline Cr 0.8  - nephrology on consult while he was here  - recently was admitted and thought at that time was CLOVER due to vancomycin nephrotoxicity and hypovolemia.  This admission likely progressed to ATN  - Cr peak of 4.75 on 9/3/20.   - Cr at discharge 2 (two) times daily.     CREON 11594 units Oral Cap DR Particles  TAKE 2 CAPSULES BY MOUTH THREE TIMES DAILY WITH MEALS AND 1 CAPSULE WITH SNACKS    Prochlorperazine Maleate (COMPAZINE) 10 mg tablet  Take 10 mg 1 hr prior to chemo, and then every 6 hours if Gen: no acute distress  Eyes: no icterus  Lungs: CTABL  Heart: RRR  Abdomen: soft, non distended   Extremities: +1 edema     Total time coordinating care for discharge: Greater than 30 minutes    Lesli Villeda DO

## 2020-09-17 NOTE — PAYOR COMM NOTE
--------------  CONTINUED STAY REVIEW------REQUESTING ADDITIONAL DAY 9/17    Payor: [address] [place] [address] REGAN/SHIVANI  Subscriber #:  [id number]  Authorization Number: [id number]    Admit date: 9/11/20  Admit time: 2312    Admitting Physician: DO Eloisa Fitzpatrick Pt's Own: Restasis (Cyclosporine) ophth emulsion 0.05%, 1 drop, Both Eyes, BID  Pt's Own: Dulera (mometasone and formoterol) 200 mcg/5 mcg/actuation Inhaler, 2 puff, Inhalation, BID  clotrimazole-betamethasone (LOTRISONE) cream, , Topical, BID  Albuterol S    09/17/2020     CO2 26.0 09/17/2020      09/17/2020     CA 7.8 09/17/2020     ALB 1.6 09/17/2020     ALKPHO 300 09/17/2020     BILT 1.2 09/17/2020     TP 5.2 09/17/2020     AST 25 09/17/2020     ALT 13 09/17/2020     INR 1.93 09/17/2020     incision to the peritoneal access sheath. Under fluoroscopic guidance,     the abdominal PleurX catheter was advanced through the peel-away sheath     and into the peritoneal cavity. The peel-away sheath was removed.          The incision was then close seen best on the lateral view. No large pleural effusion. BONES:           No fracture or visible bony lesion.     OTHER:           Left subclavian central line tip ends in the SVC.                        =====    CONCLUSION:                1. Subopti Date Action Dose Route User    9/17/2020 1003 Given 1 capsule Oral Carmelkrista Iyer RN    9/16/2020 2048 Given 1 capsule Oral Cherry Schaffer, SAIGE      Albumin Human (ALBUMINAR) 25 % solution 50 mL     Date Action Dose Route User    9/16/2020 1638 N 9/17/2020 0351 Given 10 mg Oral MarixaCherry Keller, RN      pancrelipase (Lip-Prot-Amyl) (ZENPEP) DR particles cap 40,000 Units     Date Action Dose Route User    9/17/2020 1003 Given 06861 Units Oral Jeanine Carpenter RN    9/16/2020 2048 Given 93353

## 2020-09-17 NOTE — CM/SW NOTE
CM received [facility] order for plurex drain care. [facility]. [facility] notified of no dc 9/16 and possible dc today. Philomena Barron.  Che Uribe RN, BSN  Nurse   [phone]

## 2020-09-17 NOTE — PROGRESS NOTES
[facility] Hospitalist Progress Note     CC: Hospital Follow up    PCP: Jen Hernandez       Assessment/Plan:   60year old male with PMH sig for A-fib, PV thrombus, CLOVER, DM, HTN, HL, LONDON, pancreatic Ca who presented with increased abd pain.     Recurrent Ascites to be today with home health. However cbc done and appears to have low hb and low platelets. Will re-draw lab peripherally and check. He will need PRBC if Hb < 8.0. Will hold off on discharge for now.  Discussed with Dr. John Meng MD     Code status: Full 173* 176* 228*   BUN 49* 56* 53*   CREATSERUM 2.89* 2.85* 2.73*   GFRAA 25* 26* 27*   GFRNAA 22* 22* 24*   CA 8.2* 8.0* 7.8*   * 133* 133*   K 4.0 3.8 4.0    99 100   CO2 25.0 25.0 26.0       Recent Labs   Lab 09/11/20  1802  09/13/20  0659 09/

## 2020-09-17 NOTE — PROGRESS NOTES
[facility]     S/p pleurx catheter yesterday, 9.2L drained   Reports some abd discomfort from pleurx catheter     PHYSICAL EXAM   Vital signs: /63 (BP Location: Right arm)   Pulse 87   Temp 98 °F (36.7 °C) (Oral)   R PRN  Montelukast Sodium (SINGULAIR) tab 10 mg, 10 mg, Oral, Nightly  Pantoprazole Sodium (PROTONIX) EC tab 40 mg, 40 mg, Oral, QAM AC  Acidophilus/Pectin (PROBIOTIC) CAPS 1 capsule, 1 capsule, Oral, BID  sodium bicarbonate tab 650 mg, 650 mg, Oral, TID  tr minutes of moderate sedation     services for this procedure with the assistance of an independent trained     observer who had no other duties during the procedure. Details of the     sedation are provided in the electronic     medical record.          FI Mark Grubbs MD on 9/16/2020 at 4:02 PM         Finalized by (CST): Mark Grubbs MD on 9/16/2020 at 4:06 PM               RADIOLOGY RESULT SCAN   Final Result     XR CHEST AP PORTABLE  (CPT=71045)   Final Result    PROCEDURE: XR CHEST AP PORTABLE Discharged on 9/9/20. CLOVER was due to Vancomycin nephrotoxicity and hypovolemia in the setting of N/V/D.  Now likely has developed ATN.    -- baseline creatinine 0.77 as of 8/20/2020  -- Creatinine peaked of 4.75 (9/3/2020), creatinine improving to 2.73  --

## 2020-09-17 NOTE — PLAN OF CARE
Problem: Patient Centered Care  Goal: Patient preferences are identified and integrated in the patient's plan of care  Description  Interventions:  - What would you like us to know as we care for you?   - Provide timely, complete, and accurate informatio appropriate and evaluate response  - Consider cultural and social influences on pain and pain management  - Manage/alleviate anxiety  - Utilize distraction and/or relaxation techniques  - Monitor for opioid side effects  - Notify MD/LIP if interventions un and replace protective barrier if needed  - Change diapers as needed  - Minimize the use of adhesives  Outcome: Progressing     Problem: GASTROINTESTINAL - ADULT  Goal: Minimal or absence of nausea and vomiting  Description  INTERVENTIONS:  - Maintain adeq restrictions as appropriate  Outcome: Progressing     Patient A/O x 4. Forgetful at times. Frequently reorientation provided. On room air. CPAP at night. Right pleurx drain in place. Remote tele - no calls received. On carb controlled diet. Accucheck ACHS.

## 2020-09-18 NOTE — PROGRESS NOTES
60 [employer]     Has has abdominal distention today   Received 1 unit of PLTs     PHYSICAL EXAM   Vital signs: /61 (BP Location: Right arm)   Pulse 82   Temp 98.2 °F (36.8 °C) (Oral)   Resp 18   Ht 6' 3\" (1.905 m)   W particles cap 40,000 Units, 40,000 Units, Oral, TID CC and HS  diltiazem (CARDIZEM CD) 24 hr cap 120 mg, 120 mg, Oral, Daily  LORazepam (ATIVAN) tab 0.5 mg, 0.5 mg, Oral, BID PRN  Montelukast Sodium (SINGULAIR) tab 10 mg, 10 mg, Oral, Nightly  Pantoprazole CATHETER INSERTION         INDICATIONS: 70-year-old with pancreatic cancer presenting with recurrent     malignant ascites.          : MD Andrew         ANESTHESIA: Dr. Марина Velazquez provided 18 minutes of moderate sedation     services for this pro Successful insertion of abdominal PleurX catheter. A total of 8 L of     ascites was removed the at the time of catheter placement. The catheter     is ready for use.               Dictated by (CST): Tristen Arreola MD on 9/16/2020 at 4:02 PM         Fi 7:33 AM                   ASSESSMENT & PLAN     60year old male with pmh A-fib, PV thrombus, CLOVER, DM, HTN, HL, [place], pancreatic Ca who presents with increased abd pain.      CLOVER  -- on recent admit. Discharged on 9/9/20.  CLOVER was due to Vancomycin nephrotox

## 2020-09-18 NOTE — CM/SW NOTE
CM received updated [facility] orders for plurex drain care. CM sent updated orders via Aidin to [facility].  Dc date updated as well in Aidin. Gaurav Le.  Adria oRdríguez RN, BSN  Nurse   [phone]

## 2020-09-18 NOTE — PLAN OF CARE
Pleurex catheter placed yesterday 9L drained, plan for discharge home with home health, daughter was instructed on drain and to drain and informed to drain 1L every day or 2L every other day, home health will follow.  Patient drowsy, CBC drawn as ordered pe evaluate response  - Implement non-pharmacological measures as appropriate and evaluate response  - Consider cultural and social influences on pain and pain management  - Manage/alleviate anxiety  - Utilize distraction and/or relaxation techniques  - Monit oxygen support, assess nasal septum area for skin breakdown and replace protective barrier if needed  - Change diapers as needed  - Minimize the use of adhesives  Outcome: Progressing     Problem: GASTROINTESTINAL - ADULT  Goal: Minimal or absence of nause as ordered  - Instruct patient on fluid and nutrition restrictions as appropriate  Outcome: Progressing

## 2020-09-18 NOTE — PROGRESS NOTES
[facility]  Progress Note    Marcial Glynn Patient Status:  Inpatient    1955 MRN [id number]   Location [facility] 4W/SW/SE Attending Ashley Baldwin MD   Hosp Day # 7 PCP Gutierrez Coronel     Subjective:  Now with worsening thromb Gemcitabine  - no further fevers     Dispo: okay to d/c home if plts holding     [facility]  Hematology and [id number]

## 2020-09-18 NOTE — PLAN OF CARE
Pt oriented x4 but can be confused/forgetful at times. RLQ pleurex drain in place, dressing CDI. [id number] diet, tolerating but minimal appetite. Dietary consulted. PLT 14. Per Dr. Moreno Query, 1 unit PLT today. Tolerated well. BLE edema. JACEY Hose in place.  Pt Short Term Goal: to be free from pain    Interventions:   - follow plan of care  - administer medications as ordered  - PRN pain medications  - monitor pain level  - See additional Care Plan goals for specific interventions   Outcome: Progressing     Probl positioning devices and any necessary precautions  - Promote hand-to-mouth and ability to self calm  - Expose to a variety of positions to prevent development of fixed postural patterns  - Promote flexed body  - Consult OT/PT as ordered  Outcome: Progressi Consider collaborating with pharmacy to review patient's medication profile  - Implement strategies to promote bladder emptying  Outcome: Progressing     Problem: METABOLIC/FLUID AND ELECTROLYTES - ADULT  Goal: Electrolytes maintained within normal limits

## 2020-09-18 NOTE — OCCUPATIONAL THERAPY NOTE
OCCUPATIONAL THERAPY TREATMENT NOTE - INPATIENT        Room Number: 088/557-Q           Presenting Problem: cancer related pain    Problem List  Principal Problem:    Ascites, malignant  Active Problems:    Malignant neoplasm of pancreas, unspecified locat (chemo)    WEIGHT BEARING RESTRICTION  Weight Bearing Restriction: None                PAIN ASSESSMENT  Rating: Unable to rate  Location: distended abdomen  Management Techniques: Activity promotion; Body mechanics;Repositioning     ACTIVITY TOLERANCE Comment: supinne to sit with supervision,sit to stand with CGA, ambulating with CGA, RW for support    Patient will complete toileting with Mod I  Comment: CGA     Patient will tolerate standing for 4 minutes in prep for adls with Mod I   Comment:4 min wit

## 2020-09-18 NOTE — DISCHARGE SUMMARY
General Medicine Discharge Summary     Patient ID:  Deya Roa  60year old  5/20/1955    Admit date: 9/11/2020    Discharge date and time: 9/18/20    Attending Physician: DO NAI Wheat home tramadol and oxycodone.  I gave his prescription just incase for oxy as he was not sure if he had this at home.      Possible Pneumonia, stable   -no further symptoms of pneumonia, finished his oral antibiotics in the hospital   -fever on admission     Pen-injector  Take with each meal, based on the following scale: BG <140-take no insulin; -180- take 1 units of Novolog; -220- take 2 units; -260- take 3 units; -300- take 4 units; -340-take 5 units; -380-take 6 units; B (PROAIR HFA) 108 (90 Base) MCG/ACT Inhalation Aero Soln       !! - Potential duplicate medications found. Please discuss with provider.       STOP taking these medications    Warfarin Sodium 10 MG Oral Tab    Cefuroxime Axetil 500 MG Oral Tab    Activity: a

## 2020-09-18 NOTE — PLAN OF CARE
Pt a/ox 4 but forgetful and short attention span. On room air, Cpap at night. On remote tele. Abdomen distended, reddened and firm. Right pleur-x drain in place, clamped, dressing clean dry and intact. PRN oxycodone for p ain management.    Left karina free from pain    Interventions:   - follow plan of care  - administer medications as ordered  - PRN pain medications  - monitor pain level  - See additional Care Plan goals for specific interventions   Outcome: Progressing     Problem: PAIN - ADULT  Goal: necessary precautions  - Promote hand-to-mouth and ability to self calm  - Expose to a variety of positions to prevent development of fixed postural patterns  - Promote flexed body  - Consult OT/PT as ordered  Outcome: Progressing     Problem: [facility] pharmacy to review patient's medication profile  - Implement strategies to promote bladder emptying  Outcome: Progressing     Problem: METABOLIC/FLUID AND ELECTROLYTES - ADULT  Goal: Electrolytes maintained within normal limits  Description  INTERVENTIONS:

## 2020-09-18 NOTE — PAYOR COMM NOTE
--------------  CONTINUED STAY REVIEW    Payor: Yuliana SPEARS/SHIVANI  Subscriber #:  [id number]  Authorization Number: [id number]    Admit date: 9/11/20  Admit time: 2312    Admitting Physician: Marcelle Carrington DO  Attending Physician:  Rajesh Tran DO  Prim    CKD  - Per nephrology     Fever  - Non-neutropenic  - Unclear if any infection  - May be due to Gemcitabine  - no further fevers      Dispo: okay to d/c home if plts holding      Prakash Guzman  Hematology and Oncology  [facility]    Lisa Martinez RN      pancrelipase (Lip-Prot-Amyl) (ZENPEP) DR particles cap 40,000 Units     Date Action Dose Route User    9/18/2020 1018 Given 27899 Units Oral Eda Dennis RN    9/17/2020 1832 Given 03195 Units Oral SAIGE Fontanez

## 2022-07-26 NOTE — PAYOR COMM NOTE
--------------  DISCHARGE REVIEW    Payor: [employer]HSIV[employer]  Subscriber #:  [id number]  Authorization Number: [id number]    Admit date: 8/27/20  Admit time:  1954  Discharge Date: 9/9/2020  6:20 PM     Admitting Physician: Nick Mauricio MD  Attending P Pulm: Lungs clear, normal respiratory effort  CV: Heart with regular rate and rhythm  Abd: Abdomen soft, distended  Ext: with trace edema       HPI:   Per dr. Arrington Later:  History of Present Illness:   Patient is a 60year old male with PMH sig for pancreatic ad - pt not a candidate for RRT and declines ongoing / chronic dialysis if needed, patient also declines this  -  repeat BMP in 2 days  - renal fu in  1 week.       Recent admit for LLE cellulitis and C diff colitis  - recent admit for IV abx for cellulitis a Medical Necessity Information: It is in your best interest to select a reason for this procedure from the list below. All of these items fulfill various CMS LCD requirements except lesion extends to a margin. Medication List      START taking these medications    Cefuroxime Axetil 500 MG Tabs  Commonly known as:  CEFTIN  Take 1 tablet (500 mg total) by mouth daily for 7 days.      Insulin Aspart Pen 100 UNIT/ML Sopn  Commonly known as:  NovoLOG FlexPen  Take wit * This list has 2 medication(s) that are the same as other medications prescribed for you. Read the directions carefully, and ask your doctor or other care provider to review them with you.             CONTINUE taking these medications    Alcohol Swabs Pad Include Z78.9 (Other Specified Conditions Influencing Health Status) As An Associated Diagnosis?: No Medical Necessity Clause: Nevus with severe atypia and positive margins. You can get these medications from any pharmacy    Bring a paper prescription for each of these medications  · Cefuroxime Axetil 500 MG Tabs  · sodium bicarbonate 650 MG Tabs  · traMADol HCl 50 MG Tabs  · vancomycin HCl 125 MG Caps  · Warfarin Sodium 10 MG Lab: 6 Lab Facility: 3 Biopsy Photograph Reviewed: Yes Size Of Lesion In Cm: 0.4 X Size Of Lesion In Cm (Optional): 0.5 Size Of Margin In Cm: 0.2 Eye Clamp Note Details: An eye clamp was used during the procedure. Excision Method: Elliptical Saucerization Depth: dermis and superficial adipose tissue Repair Type: Intermediate Suturegard Retention Suture: 2-0 Nylon Retention Suture Bite Size: 3 mm Length To Time In Minutes Device Was In Place: 10 Number Of Hemigard Strips Per Side: 1 Undermining Type: Entire Wound Debridement Text: The wound edges were debrided prior to proceeding with the closure to facilitate wound healing. Helical Rim Text: The closure involved the helical rim. Vermilion Border Text: The closure involved the vermilion border. Nostril Rim Text: The closure involved the nostril rim. Retention Suture Text: Retention sutures were placed to support the closure and prevent dehiscence. Primary Defect Length (In Cm): 0 Suture Removal: 7 days Graft Basting Suture (Optional): 5-0 Fast Absorbing Gut Additional Graft Basting Suture (Optional): 5-0 Prolene Epidermal Closure Graft Donor Site (Optional): running Detail Level: Detailed Excision Depth: adipose tissue Scalpel Size: 15 blade Anesthesia Type: 1% lidocaine with 1:100,000 epinephrine and a 1:10 solution of 8.4% sodium bicarbonate Hemostasis: Electrocautery Estimated Blood Loss (Cc): minimal Anesthesia Type: 1% lidocaine with epinephrine Deep Sutures: 4-0 Monocryl Dermal Closure: simple interrupted Wound Care: Mastisol Dressing: steri-strips and pressure dressing Suturegard Intro: Intraoperative tissue expansion was performed, utilizing the SUTUREGARD device, in order to reduce wound tension. Suturegard Body: The suture ends were repeatedly re-tightened and re-clamped to achieve the desired tissue expansion. Hemigard Intro: Due to skin fragility and wound tension, it was decided to use HEMIGARD adhesive retention suture devices to permit a linear closure. The skin was cleaned and dried for a 6cm distance away from the wound. Excessive hair, if present, was removed to allow for adhesion. Hemigard Postcare Instructions: The HEMIGARD strips are to remain completely dry for at least 5-7 days. Positioning (Leave Blank If You Do Not Want): The patient was placed in a comfortable position exposing the surgical site. Complex Repair Preamble Text (Leave Blank If You Do Not Want): Extensive wide undermining was performed. Intermediate Repair Preamble Text (Leave Blank If You Do Not Want): Undermining was performed with blunt dissection. Fusiform Excision Additional Text (Leave Blank If You Do Not Want): The margin was drawn around the clinically apparent lesion.  A fusiform shape was then drawn on the skin incorporating the lesion and margins.  Incisions were then made along these lines to the appropriate tissue plane and the lesion was extirpated. Eliptical Excision Additional Text (Leave Blank If You Do Not Want): The margin was drawn around the clinically apparent lesion.  An elliptical shape was then drawn on the skin incorporating the lesion and margins.  Incisions were then made along these lines to the appropriate tissue plane and the lesion was extirpated. Saucerization Excision Additional Text (Leave Blank If You Do Not Want): The margin was drawn around the clinically apparent lesion.  Incisions were then made along these lines, in a tangential fashion, to the appropriate tissue plane and the lesion was extirpated. Slit Excision Additional Text (Leave Blank If You Do Not Want): A linear line was drawn on the skin overlying the lesion. An incision was made slowly until the lesion was visualized.  Once visualized, the lesion was removed with blunt dissection. Excisional Biopsy Additional Text (Leave Blank If You Do Not Want): The margin was drawn around the clinically apparent lesion. An elliptical shape was then drawn on the skin incorporating the lesion and margins.  Incisions were then made along these lines to the appropriate tissue plane and the lesion was extirpated. Perilesional Excision Additional Text (Leave Blank If You Do Not Want): The margin was drawn around the clinically apparent lesion. Incisions were then made along these lines to the appropriate tissue plane and the lesion was extirpated. Repair Performed By Another Provider Text (Leave Blank If You Do Not Want): After the tissue was excised the defect was repaired by another provider. No Repair - Repaired With Adjacent Surgical Defect Text (Leave Blank If You Do Not Want): After the excision the defect was repaired concurrently with another surgical defect which was in close approximation. Adjacent Tissue Transfer Text: The defect edges were debeveled with a #15 scalpel blade.  Given the location of the defect and the proximity to free margins an adjacent tissue transfer was deemed most appropriate.  Using a sterile surgical marker, an appropriate flap was drawn incorporating the defect and placing the expected incisions within the relaxed skin tension lines where possible.    The area thus outlined was incised deep to adipose tissue with a #15 scalpel blade.  The skin margins were undermined to an appropriate distance in all directions utilizing iris scissors. Advancement Flap (Single) Text: The defect edges were debeveled with a #15 scalpel blade.  Given the location of the defect and the proximity to free margins a single advancement flap was deemed most appropriate.  Using a sterile surgical marker, an appropriate advancement flap was drawn incorporating the defect and placing the expected incisions within the relaxed skin tension lines where possible.    The area thus outlined was incised deep to adipose tissue with a #15 scalpel blade.  The skin margins were undermined to an appropriate distance in all directions utilizing iris scissors. Advancement Flap (Double) Text: The defect edges were debeveled with a #15 scalpel blade.  Given the location of the defect and the proximity to free margins a double advancement flap was deemed most appropriate.  Using a sterile surgical marker, the appropriate advancement flaps were drawn incorporating the defect and placing the expected incisions within the relaxed skin tension lines where possible.    The area thus outlined was incised deep to adipose tissue with a #15 scalpel blade.  The skin margins were undermined to an appropriate distance in all directions utilizing iris scissors. Burow's Advancement Flap Text: The defect edges were debeveled with a #15 scalpel blade.  Given the location of the defect and the proximity to free margins a Burow's advancement flap was deemed most appropriate.  Using a sterile surgical marker, the appropriate advancement flap was drawn incorporating the defect and placing the expected incisions within the relaxed skin tension lines where possible.    The area thus outlined was incised deep to adipose tissue with a #15 scalpel blade.  The skin margins were undermined to an appropriate distance in all directions utilizing iris scissors. Chonodrocutaneous Helical Advancement Flap Text: The defect edges were debeveled with a #15 scalpel blade.  Given the location of the defect and the proximity to free margins a chondrocutaneous helical advancement flap was deemed most appropriate.  Using a sterile surgical marker, the appropriate advancement flap was drawn incorporating the defect and placing the expected incisions within the relaxed skin tension lines where possible.    The area thus outlined was incised deep to adipose tissue with a #15 scalpel blade.  The skin margins were undermined to an appropriate distance in all directions utilizing iris scissors. Crescentic Advancement Flap Text: The defect edges were debeveled with a #15 scalpel blade.  Given the location of the defect and the proximity to free margins a crescentic advancement flap was deemed most appropriate.  Using a sterile surgical marker, the appropriate advancement flap was drawn incorporating the defect and placing the expected incisions within the relaxed skin tension lines where possible.    The area thus outlined was incised deep to adipose tissue with a #15 scalpel blade.  The skin margins were undermined to an appropriate distance in all directions utilizing iris scissors. A-T Advancement Flap Text: The defect edges were debeveled with a #15 scalpel blade.  Given the location of the defect, shape of the defect and the proximity to free margins an A-T advancement flap was deemed most appropriate.  Using a sterile surgical marker, an appropriate advancement flap was drawn incorporating the defect and placing the expected incisions within the relaxed skin tension lines where possible.    The area thus outlined was incised deep to adipose tissue with a #15 scalpel blade.  The skin margins were undermined to an appropriate distance in all directions utilizing iris scissors. O-T Advancement Flap Text: The defect edges were debeveled with a #15 scalpel blade.  Given the location of the defect, shape of the defect and the proximity to free margins an O-T advancement flap was deemed most appropriate.  Using a sterile surgical marker, an appropriate advancement flap was drawn incorporating the defect and placing the expected incisions within the relaxed skin tension lines where possible.    The area thus outlined was incised deep to adipose tissue with a #15 scalpel blade.  The skin margins were undermined to an appropriate distance in all directions utilizing iris scissors. O-L Flap Text: The defect edges were debeveled with a #15 scalpel blade.  Given the location of the defect, shape of the defect and the proximity to free margins an O-L flap was deemed most appropriate.  Using a sterile surgical marker, an appropriate advancement flap was drawn incorporating the defect and placing the expected incisions within the relaxed skin tension lines where possible.    The area thus outlined was incised deep to adipose tissue with a #15 scalpel blade.  The skin margins were undermined to an appropriate distance in all directions utilizing iris scissors. O-Z Flap Text: The defect edges were debeveled with a #15 scalpel blade.  Given the location of the defect, shape of the defect and the proximity to free margins an O-Z flap was deemed most appropriate.  Using a sterile surgical marker, an appropriate transposition flap was drawn incorporating the defect and placing the expected incisions within the relaxed skin tension lines where possible. The area thus outlined was incised deep to adipose tissue with a #15 scalpel blade.  The skin margins were undermined to an appropriate distance in all directions utilizing iris scissors. Double O-Z Flap Text: The defect edges were debeveled with a #15 scalpel blade.  Given the location of the defect, shape of the defect and the proximity to free margins a Double O-Z flap was deemed most appropriate.  Using a sterile surgical marker, an appropriate transposition flap was drawn incorporating the defect and placing the expected incisions within the relaxed skin tension lines where possible. The area thus outlined was incised deep to adipose tissue with a #15 scalpel blade.  The skin margins were undermined to an appropriate distance in all directions utilizing iris scissors. V-Y Flap Text: The defect edges were debeveled with a #15 scalpel blade.  Given the location of the defect, shape of the defect and the proximity to free margins a V-Y flap was deemed most appropriate.  Using a sterile surgical marker, an appropriate advancement flap was drawn incorporating the defect and placing the expected incisions within the relaxed skin tension lines where possible.    The area thus outlined was incised deep to adipose tissue with a #15 scalpel blade.  The skin margins were undermined to an appropriate distance in all directions utilizing iris scissors. Advancement-Rotation Flap Text: The defect edges were debeveled with a #15 scalpel blade.  Given the location of the defect, shape of the defect and the proximity to free margins an advancement-rotation flap was deemed most appropriate.  Using a sterile surgical marker, an appropriate flap was drawn incorporating the defect and placing the expected incisions within the relaxed skin tension lines where possible. The area thus outlined was incised deep to adipose tissue with a #15 scalpel blade.  The skin margins were undermined to an appropriate distance in all directions utilizing iris scissors. Mercedes Flap Text: The defect edges were debeveled with a #15 scalpel blade.  Given the location of the defect, shape of the defect and the proximity to free margins a Mercedes flap was deemed most appropriate.  Using a sterile surgical marker, an appropriate advancement flap was drawn incorporating the defect and placing the expected incisions within the relaxed skin tension lines where possible. The area thus outlined was incised deep to adipose tissue with a #15 scalpel blade.  The skin margins were undermined to an appropriate distance in all directions utilizing iris scissors. Modified Advancement Flap Text: The defect edges were debeveled with a #15 scalpel blade.  Given the location of the defect, shape of the defect and the proximity to free margins a modified advancement flap was deemed most appropriate.  Using a sterile surgical marker, an appropriate advancement flap was drawn incorporating the defect and placing the expected incisions within the relaxed skin tension lines where possible.    The area thus outlined was incised deep to adipose tissue with a #15 scalpel blade.  The skin margins were undermined to an appropriate distance in all directions utilizing iris scissors. Mucosal Advancement Flap Text: Given the location of the defect, shape of the defect and the proximity to free margins a mucosal advancement flap was deemed most appropriate. Incisions were made with a 15 blade scalpel in the appropriate fashion along the cutaneous vermillion border and the mucosal lip. The remaining actinically damaged mucosal tissue was excised.  The mucosal advancement flap was then elevated to the gingival sulcus with care taken to preserve the neurovascular structures and advanced into the primary defect. Care was taken to ensure that precise realignment of the vermilion border was achieved. Peng Advancement Flap Text: The defect edges were debeveled with a #15 scalpel blade.  Given the location of the defect, shape of the defect and the proximity to free margins a Peng advancement flap was deemed most appropriate.  Using a sterile surgical marker, an appropriate advancement flap was drawn incorporating the defect and placing the expected incisions within the relaxed skin tension lines where possible. The area thus outlined was incised deep to adipose tissue with a #15 scalpel blade.  The skin margins were undermined to an appropriate distance in all directions utilizing iris scissors. Hatchet Flap Text: The defect edges were debeveled with a #15 scalpel blade.  Given the location of the defect, shape of the defect and the proximity to free margins a hatchet flap was deemed most appropriate.  Using a sterile surgical marker, an appropriate hatchet flap was drawn incorporating the defect and placing the expected incisions within the relaxed skin tension lines where possible.    The area thus outlined was incised deep to adipose tissue with a #15 scalpel blade.  The skin margins were undermined to an appropriate distance in all directions utilizing iris scissors. Rotation Flap Text: The defect edges were debeveled with a #15 scalpel blade.  Given the location of the defect, shape of the defect and the proximity to free margins a rotation flap was deemed most appropriate.  Using a sterile surgical marker, an appropriate rotation flap was drawn incorporating the defect and placing the expected incisions within the relaxed skin tension lines where possible.    The area thus outlined was incised deep to adipose tissue with a #15 scalpel blade.  The skin margins were undermined to an appropriate distance in all directions utilizing iris scissors. Spiral Flap Text: The defect edges were debeveled with a #15 scalpel blade.  Given the location of the defect, shape of the defect and the proximity to free margins a spiral flap was deemed most appropriate.  Using a sterile surgical marker, an appropriate rotation flap was drawn incorporating the defect and placing the expected incisions within the relaxed skin tension lines where possible. The area thus outlined was incised deep to adipose tissue with a #15 scalpel blade.  The skin margins were undermined to an appropriate distance in all directions utilizing iris scissors. Staged Advancement Flap Text: The defect edges were debeveled with a #15 scalpel blade.  Given the location of the defect, shape of the defect and the proximity to free margins a staged advancement flap was deemed most appropriate.  Using a sterile surgical marker, an appropriate advancement flap was drawn incorporating the defect and placing the expected incisions within the relaxed skin tension lines where possible. The area thus outlined was incised deep to adipose tissue with a #15 scalpel blade.  The skin margins were undermined to an appropriate distance in all directions utilizing iris scissors. Star Wedge Flap Text: The defect edges were debeveled with a #15 scalpel blade.  Given the location of the defect, shape of the defect and the proximity to free margins a star wedge flap was deemed most appropriate.  Using a sterile surgical marker, an appropriate rotation flap was drawn incorporating the defect and placing the expected incisions within the relaxed skin tension lines where possible. The area thus outlined was incised deep to adipose tissue with a #15 scalpel blade.  The skin margins were undermined to an appropriate distance in all directions utilizing iris scissors. Transposition Flap Text: The defect edges were debeveled with a #15 scalpel blade.  Given the location of the defect and the proximity to free margins a transposition flap was deemed most appropriate.  Using a sterile surgical marker, an appropriate transposition flap was drawn incorporating the defect.    The area thus outlined was incised deep to adipose tissue with a #15 scalpel blade.  The skin margins were undermined to an appropriate distance in all directions utilizing iris scissors. Muscle Hinge Flap Text: The defect edges were debeveled with a #15 scalpel blade.  Given the size, depth and location of the defect and the proximity to free margins a muscle hinge flap was deemed most appropriate.  Using a sterile surgical marker, an appropriate hinge flap was drawn incorporating the defect. The area thus outlined was incised with a #15 scalpel blade.  The skin margins were undermined to an appropriate distance in all directions utilizing iris scissors. Mustarde Flap Text: The defect edges were debeveled with a #15 scalpel blade.  Given the size, depth and location of the defect and the proximity to free margins a Mustarde flap was deemed most appropriate.  Using a sterile surgical marker, an appropriate flap was drawn incorporating the defect. The area thus outlined was incised with a #15 scalpel blade.  The skin margins were undermined to an appropriate distance in all directions utilizing iris scissors. Nasal Turnover Hinge Flap Text: The defect edges were debeveled with a #15 scalpel blade.  Given the size, depth, location of the defect and the defect being full thickness a nasal turnover hinge flap was deemed most appropriate.  Using a sterile surgical marker, an appropriate hinge flap was drawn incorporating the defect. The area thus outlined was incised with a #15 scalpel blade. The flap was designed to recreate the nasal mucosal lining and the alar rim. The skin margins were undermined to an appropriate distance in all directions utilizing iris scissors. Nasalis-Muscle-Based Myocutaneous Island Pedicle Flap Text: Using a #15 blade, an incision was made around the donor flap to the level of the nasalis muscle. Wide lateral undermining was then performed in both the subcutaneous plane above the nasalis muscle, and in a submuscular plane just above periosteum. This allowed the formation of a free nasalis muscle axial pedicle (based on the angular artery) which was still attached to the actual cutaneous flap, increasing its mobility and vascular viability. Hemostasis was obtained with pinpoint electrocoagulation. The flap was mobilized into position and the pivotal anchor points positioned and stabilized with buried interrupted sutures. Subcutaneous and dermal tissues were closed in a multilayered fashion with sutures. Tissue redundancies were excised, and the epidermal edges were apposed without significant tension and sutured with sutures. Orbicularis Oris Muscle Flap Text: The defect edges were debeveled with a #15 scalpel blade.  Given that the defect affected the competency of the oral sphincter an orbicularis oris muscle flap was deemed most appropriate to restore this competency and normal muscle function.  Using a sterile surgical marker, an appropriate flap was drawn incorporating the defect. The area thus outlined was incised with a #15 scalpel blade. Melolabial Transposition Flap Text: The defect edges were debeveled with a #15 scalpel blade.  Given the location of the defect and the proximity to free margins a melolabial flap was deemed most appropriate.  Using a sterile surgical marker, an appropriate melolabial transposition flap was drawn incorporating the defect.    The area thus outlined was incised deep to adipose tissue with a #15 scalpel blade.  The skin margins were undermined to an appropriate distance in all directions utilizing iris scissors. Rhombic Flap Text: The defect edges were debeveled with a #15 scalpel blade.  Given the location of the defect and the proximity to free margins a rhombic flap was deemed most appropriate.  Using a sterile surgical marker, an appropriate rhombic flap was drawn incorporating the defect.    The area thus outlined was incised deep to adipose tissue with a #15 scalpel blade.  The skin margins were undermined to an appropriate distance in all directions utilizing iris scissors. Rhomboid Transposition Flap Text: The defect edges were debeveled with a #15 scalpel blade.  Given the location of the defect and the proximity to free margins a rhomboid transposition flap was deemed most appropriate.  Using a sterile surgical marker, an appropriate rhomboid flap was drawn incorporating the defect.    The area thus outlined was incised deep to adipose tissue with a #15 scalpel blade.  The skin margins were undermined to an appropriate distance in all directions utilizing iris scissors. Bi-Rhombic Flap Text: The defect edges were debeveled with a #15 scalpel blade.  Given the location of the defect and the proximity to free margins a bi-rhombic flap was deemed most appropriate.  Using a sterile surgical marker, an appropriate rhombic flap was drawn incorporating the defect. The area thus outlined was incised deep to adipose tissue with a #15 scalpel blade.  The skin margins were undermined to an appropriate distance in all directions utilizing iris scissors. Helical Rim Advancement Flap Text: The defect edges were debeveled with a #15 blade scalpel.  Given the location of the defect and the proximity to free margins (helical rim) a double helical rim advancement flap was deemed most appropriate.  Using a sterile surgical marker, the appropriate advancement flaps were drawn incorporating the defect and placing the expected incisions between the helical rim and antihelix where possible.  The area thus outlined was incised through and through with a #15 scalpel blade.  With a skin hook and iris scissors, the flaps were gently and sharply undermined and freed up. Bilateral Helical Rim Advancement Flap Text: The defect edges were debeveled with a #15 blade scalpel.  Given the location of the defect and the proximity to free margins (helical rim) a bilateral helical rim advancement flap was deemed most appropriate.  Using a sterile surgical marker, the appropriate advancement flaps were drawn incorporating the defect and placing the expected incisions between the helical rim and antihelix where possible.  The area thus outlined was incised through and through with a #15 scalpel blade.  With a skin hook and iris scissors, the flaps were gently and sharply undermined and freed up. Ear Star Wedge Flap Text: The defect edges were debeveled with a #15 blade scalpel.  Given the location of the defect and the proximity to free margins (helical rim) an ear star wedge flap was deemed most appropriate.  Using a sterile surgical marker, the appropriate flap was drawn incorporating the defect and placing the expected incisions between the helical rim and antihelix where possible.  The area thus outlined was incised through and through with a #15 scalpel blade. Banner Transposition Flap Text: The defect edges were debeveled with a #15 scalpel blade.  Given the location of the defect and the proximity to free margins a Banner transposition flap was deemed most appropriate.  Using a sterile surgical marker, an appropriate flap drawn around the defect. The area thus outlined was incised deep to adipose tissue with a #15 scalpel blade.  The skin margins were undermined to an appropriate distance in all directions utilizing iris scissors. Bilobed Flap Text: The defect edges were debeveled with a #15 scalpel blade.  Given the location of the defect and the proximity to free margins a bilobe flap was deemed most appropriate.  Using a sterile surgical marker, an appropriate bilobe flap drawn around the defect.    The area thus outlined was incised deep to adipose tissue with a #15 scalpel blade.  The skin margins were undermined to an appropriate distance in all directions utilizing iris scissors. Bilobed Transposition Flap Text: The defect edges were debeveled with a #15 scalpel blade.  Given the location of the defect and the proximity to free margins a bilobed transposition flap was deemed most appropriate.  Using a sterile surgical marker, an appropriate bilobe flap drawn around the defect.    The area thus outlined was incised deep to adipose tissue with a #15 scalpel blade.  The skin margins were undermined to an appropriate distance in all directions utilizing iris scissors. Trilobed Flap Text: The defect edges were debeveled with a #15 scalpel blade.  Given the location of the defect and the proximity to free margins a trilobed flap was deemed most appropriate.  Using a sterile surgical marker, an appropriate trilobed flap drawn around the defect.    The area thus outlined was incised deep to adipose tissue with a #15 scalpel blade.  The skin margins were undermined to an appropriate distance in all directions utilizing iris scissors. Dorsal Nasal Flap Text: The defect edges were debeveled with a #15 scalpel blade.  Given the location of the defect and the proximity to free margins a dorsal nasal flap was deemed most appropriate.  Using a sterile surgical marker, an appropriate dorsal nasal flap was drawn around the defect.    The area thus outlined was incised deep to adipose tissue with a #15 scalpel blade.  The skin margins were undermined to an appropriate distance in all directions utilizing iris scissors. Island Pedicle Flap Text: The defect edges were debeveled with a #15 scalpel blade.  Given the location of the defect, shape of the defect and the proximity to free margins an island pedicle advancement flap was deemed most appropriate.  Using a sterile surgical marker, an appropriate advancement flap was drawn incorporating the defect, outlining the appropriate donor tissue and placing the expected incisions within the relaxed skin tension lines where possible.    The area thus outlined was incised deep to adipose tissue with a #15 scalpel blade.  The skin margins were undermined to an appropriate distance in all directions around the primary defect and laterally outward around the island pedicle utilizing iris scissors.  There was minimal undermining beneath the pedicle flap. Island Pedicle Flap With Canthal Suspension Text: The defect edges were debeveled with a #15 scalpel blade.  Given the location of the defect, shape of the defect and the proximity to free margins an island pedicle advancement flap was deemed most appropriate.  Using a sterile surgical marker, an appropriate advancement flap was drawn incorporating the defect, outlining the appropriate donor tissue and placing the expected incisions within the relaxed skin tension lines where possible. The area thus outlined was incised deep to adipose tissue with a #15 scalpel blade.  The skin margins were undermined to an appropriate distance in all directions around the primary defect and laterally outward around the island pedicle utilizing iris scissors.  There was minimal undermining beneath the pedicle flap. A suspension suture was placed in the canthal tendon to prevent tension and prevent ectropion. Alar Island Pedicle Flap Text: The defect edges were debeveled with a #15 scalpel blade.  Given the location of the defect, shape of the defect and the proximity to the alar rim an island pedicle advancement flap was deemed most appropriate.  Using a sterile surgical marker, an appropriate advancement flap was drawn incorporating the defect, outlining the appropriate donor tissue and placing the expected incisions within the nasal ala running parallel to the alar rim. The area thus outlined was incised with a #15 scalpel blade.  The skin margins were undermined minimally to an appropriate distance in all directions around the primary defect and laterally outward around the island pedicle utilizing iris scissors.  There was minimal undermining beneath the pedicle flap. Double Island Pedicle Flap Text: The defect edges were debeveled with a #15 scalpel blade.  Given the location of the defect, shape of the defect and the proximity to free margins a double island pedicle advancement flap was deemed most appropriate.  Using a sterile surgical marker, an appropriate advancement flap was drawn incorporating the defect, outlining the appropriate donor tissue and placing the expected incisions within the relaxed skin tension lines where possible.    The area thus outlined was incised deep to adipose tissue with a #15 scalpel blade.  The skin margins were undermined to an appropriate distance in all directions around the primary defect and laterally outward around the island pedicle utilizing iris scissors.  There was minimal undermining beneath the pedicle flap. Island Pedicle Flap-Requiring Vessel Identification Text: The defect edges were debeveled with a #15 scalpel blade.  Given the location of the defect, shape of the defect and the proximity to free margins an island pedicle advancement flap was deemed most appropriate.  Using a sterile surgical marker, an appropriate advancement flap was drawn, based on the axial vessel mentioned above, incorporating the defect, outlining the appropriate donor tissue and placing the expected incisions within the relaxed skin tension lines where possible.    The area thus outlined was incised deep to adipose tissue with a #15 scalpel blade.  The skin margins were undermined to an appropriate distance in all directions around the primary defect and laterally outward around the island pedicle utilizing iris scissors.  There was minimal undermining beneath the pedicle flap. Keystone Flap Text: The defect edges were debeveled with a #15 scalpel blade.  Given the location of the defect, shape of the defect a keystone flap was deemed most appropriate.  Using a sterile surgical marker, an appropriate keystone flap was drawn incorporating the defect, outlining the appropriate donor tissue and placing the expected incisions within the relaxed skin tension lines where possible. The area thus outlined was incised deep to adipose tissue with a #15 scalpel blade.  The skin margins were undermined to an appropriate distance in all directions around the primary defect and laterally outward around the flap utilizing iris scissors. O-T Plasty Text: The defect edges were debeveled with a #15 scalpel blade.  Given the location of the defect, shape of the defect and the proximity to free margins an O-T plasty was deemed most appropriate.  Using a sterile surgical marker, an appropriate O-T plasty was drawn incorporating the defect and placing the expected incisions within the relaxed skin tension lines where possible.    The area thus outlined was incised deep to adipose tissue with a #15 scalpel blade.  The skin margins were undermined to an appropriate distance in all directions utilizing iris scissors. O-Z Plasty Text: The defect edges were debeveled with a #15 scalpel blade.  Given the location of the defect, shape of the defect and the proximity to free margins an O-Z plasty (double transposition flap) was deemed most appropriate.  Using a sterile surgical marker, the appropriate transposition flaps were drawn incorporating the defect and placing the expected incisions within the relaxed skin tension lines where possible.    The area thus outlined was incised deep to adipose tissue with a #15 scalpel blade.  The skin margins were undermined to an appropriate distance in all directions utilizing iris scissors.  Hemostasis was achieved with electrocautery.  The flaps were then transposed into place, one clockwise and the other counterclockwise, and anchored with interrupted buried subcutaneous sutures. Double O-Z Plasty Text: The defect edges were debeveled with a #15 scalpel blade.  Given the location of the defect, shape of the defect and the proximity to free margins a Double O-Z plasty (double transposition flap) was deemed most appropriate.  Using a sterile surgical marker, the appropriate transposition flaps were drawn incorporating the defect and placing the expected incisions within the relaxed skin tension lines where possible. The area thus outlined was incised deep to adipose tissue with a #15 scalpel blade.  The skin margins were undermined to an appropriate distance in all directions utilizing iris scissors.  Hemostasis was achieved with electrocautery.  The flaps were then transposed into place, one clockwise and the other counterclockwise, and anchored with interrupted buried subcutaneous sutures. V-Y Plasty Text: The defect edges were debeveled with a #15 scalpel blade.  Given the location of the defect, shape of the defect and the proximity to free margins an V-Y advancement flap was deemed most appropriate.  Using a sterile surgical marker, an appropriate advancement flap was drawn incorporating the defect and placing the expected incisions within the relaxed skin tension lines where possible.    The area thus outlined was incised deep to adipose tissue with a #15 scalpel blade.  The skin margins were undermined to an appropriate distance in all directions utilizing iris scissors. H Plasty Text: Given the location of the defect, shape of the defect and the proximity to free margins a H-plasty was deemed most appropriate for repair.  Using a sterile surgical marker, the appropriate advancement arms of the H-plasty were drawn incorporating the defect and placing the expected incisions within the relaxed skin tension lines where possible. The area thus outlined was incised deep to adipose tissue with a #15 scalpel blade. The skin margins were undermined to an appropriate distance in all directions utilizing iris scissors.  The opposing advancement arms were then advanced into place in opposite direction and anchored with interrupted buried subcutaneous sutures. W Plasty Text: The lesion was extirpated to the level of the fat with a #15 scalpel blade.  Given the location of the defect, shape of the defect and the proximity to free margins a W-plasty was deemed most appropriate for repair.  Using a sterile surgical marker, the appropriate transposition arms of the W-plasty were drawn incorporating the defect and placing the expected incisions within the relaxed skin tension lines where possible.    The area thus outlined was incised deep to adipose tissue with a #15 scalpel blade.  The skin margins were undermined to an appropriate distance in all directions utilizing iris scissors.  The opposing transposition arms were then transposed into place in opposite direction and anchored with interrupted buried subcutaneous sutures. Z Plasty Text: The lesion was extirpated to the level of the fat with a #15 scalpel blade.  Given the location of the defect, shape of the defect and the proximity to free margins a Z-plasty was deemed most appropriate for repair.  Using a sterile surgical marker, the appropriate transposition arms of the Z-plasty were drawn incorporating the defect and placing the expected incisions within the relaxed skin tension lines where possible.    The area thus outlined was incised deep to adipose tissue with a #15 scalpel blade.  The skin margins were undermined to an appropriate distance in all directions utilizing iris scissors.  The opposing transposition arms were then transposed into place in opposite direction and anchored with interrupted buried subcutaneous sutures. Zygomaticofacial Flap Text: Given the location of the defect, shape of the defect and the proximity to free margins a zygomaticofacial flap was deemed most appropriate for repair.  Using a sterile surgical marker, the appropriate flap was drawn incorporating the defect and placing the expected incisions within the relaxed skin tension lines where possible. The area thus outlined was incised deep to adipose tissue with a #15 scalpel blade with preservation of a vascular pedicle.  The skin margins were undermined to an appropriate distance in all directions utilizing iris scissors.  The flap was then placed into the defect and anchored with interrupted buried subcutaneous sutures. Cheek Interpolation Flap Text: A decision was made to reconstruct the defect utilizing an interpolation axial flap and a staged reconstruction.  A telfa template was made of the defect.  This telfa template was then used to outline the Cheek Interpolation flap.  The donor area for the pedicle flap was then injected with anesthesia.  The flap was excised through the skin and subcutaneous tissue down to the layer of the underlying musculature.  The interpolation flap was carefully excised within this deep plane to maintain its blood supply.  The edges of the donor site were undermined.   The donor site was closed in a primary fashion.  The pedicle was then rotated into position and sutured.  Once the tube was sutured into place, adequate blood supply was confirmed with blanching and refill.  The pedicle was then wrapped with xeroform gauze and dressed appropriately with a telfa and gauze bandage to ensure continued blood supply and protect the attached pedicle. Cheek-To-Nose Interpolation Flap Text: A decision was made to reconstruct the defect utilizing an interpolation axial flap and a staged reconstruction.  A telfa template was made of the defect.  This telfa template was then used to outline the Cheek-To-Nose Interpolation flap.  The donor area for the pedicle flap was then injected with anesthesia.  The flap was excised through the skin and subcutaneous tissue down to the layer of the underlying musculature.  The interpolation flap was carefully excised within this deep plane to maintain its blood supply.  The edges of the donor site were undermined.   The donor site was closed in a primary fashion.  The pedicle was then rotated into position and sutured.  Once the tube was sutured into place, adequate blood supply was confirmed with blanching and refill.  The pedicle was then wrapped with xeroform gauze and dressed appropriately with a telfa and gauze bandage to ensure continued blood supply and protect the attached pedicle. Interpolation Flap Text: A decision was made to reconstruct the defect utilizing an interpolation axial flap and a staged reconstruction.  A telfa template was made of the defect.  This telfa template was then used to outline the interpolation flap.  The donor area for the pedicle flap was then injected with anesthesia.  The flap was excised through the skin and subcutaneous tissue down to the layer of the underlying musculature.  The interpolation flap was carefully excised within this deep plane to maintain its blood supply.  The edges of the donor site were undermined.   The donor site was closed in a primary fashion.  The pedicle was then rotated into position and sutured.  Once the tube was sutured into place, adequate blood supply was confirmed with blanching and refill.  The pedicle was then wrapped with xeroform gauze and dressed appropriately with a telfa and gauze bandage to ensure continued blood supply and protect the attached pedicle. Melolabial Interpolation Flap Text: A decision was made to reconstruct the defect utilizing an interpolation axial flap and a staged reconstruction.  A telfa template was made of the defect.  This telfa template was then used to outline the melolabial interpolation flap.  The donor area for the pedicle flap was then injected with anesthesia.  The flap was excised through the skin and subcutaneous tissue down to the layer of the underlying musculature.  The pedicle flap was carefully excised within this deep plane to maintain its blood supply.  The edges of the donor site were undermined.   The donor site was closed in a primary fashion.  The pedicle was then rotated into position and sutured.  Once the tube was sutured into place, adequate blood supply was confirmed with blanching and refill.  The pedicle was then wrapped with xeroform gauze and dressed appropriately with a telfa and gauze bandage to ensure continued blood supply and protect the attached pedicle. Mastoid Interpolation Flap Text: A decision was made to reconstruct the defect utilizing an interpolation axial flap and a staged reconstruction.  A telfa template was made of the defect.  This telfa template was then used to outline the mastoid interpolation flap.  The donor area for the pedicle flap was then injected with anesthesia.  The flap was excised through the skin and subcutaneous tissue down to the layer of the underlying musculature.  The pedicle flap was carefully excised within this deep plane to maintain its blood supply.  The edges of the donor site were undermined.   The donor site was closed in a primary fashion.  The pedicle was then rotated into position and sutured.  Once the tube was sutured into place, adequate blood supply was confirmed with blanching and refill.  The pedicle was then wrapped with xeroform gauze and dressed appropriately with a telfa and gauze bandage to ensure continued blood supply and protect the attached pedicle. Posterior Auricular Interpolation Flap Text: A decision was made to reconstruct the defect utilizing an interpolation axial flap and a staged reconstruction.  A telfa template was made of the defect.  This telfa template was then used to outline the posterior auricular interpolation flap.  The donor area for the pedicle flap was then injected with anesthesia.  The flap was excised through the skin and subcutaneous tissue down to the layer of the underlying musculature.  The pedicle flap was carefully excised within this deep plane to maintain its blood supply.  The edges of the donor site were undermined.   The donor site was closed in a primary fashion.  The pedicle was then rotated into position and sutured.  Once the tube was sutured into place, adequate blood supply was confirmed with blanching and refill.  The pedicle was then wrapped with xeroform gauze and dressed appropriately with a telfa and gauze bandage to ensure continued blood supply and protect the attached pedicle. Paramedian Forehead Flap Text: A decision was made to reconstruct the defect utilizing an interpolation axial flap and a staged reconstruction.  A telfa template was made of the defect.  This telfa template was then used to outline the paramedian forehead pedicle flap.  The donor area for the pedicle flap was then injected with anesthesia.  The flap was excised through the skin and subcutaneous tissue down to the layer of the underlying musculature.  The pedicle flap was carefully excised within this deep plane to maintain its blood supply.  The edges of the donor site were undermined.   The donor site was closed in a primary fashion.  The pedicle was then rotated into position and sutured.  Once the tube was sutured into place, adequate blood supply was confirmed with blanching and refill.  The pedicle was then wrapped with xeroform gauze and dressed appropriately with a telfa and gauze bandage to ensure continued blood supply and protect the attached pedicle. Abbe Flap (Upper To Lower Lip) Text: The defect of the lower lip was assessed and measured.  Given the location and size of the defect, an Abbe flap was deemed most appropriate.  Using a sterile surgical marker, an appropriate Abbe flap was measured and drawn on the upper lip. Local anesthesia was then infiltrated.  A scalpel was then used to incise the upper lip through and through the skin, vermilion, muscle and mucosa, leaving the flap pedicled on the opposite side.  The flap was then rotated and transferred to the lower lip defect.  The flap was then sutured into place with a three layer technique, closing the orbicularis oris muscle layer with subcutaneous buried sutures, followed by a mucosal layer and an epidermal layer. Abbe Flap (Lower To Upper Lip) Text: The defect of the upper lip was assessed and measured.  Given the location and size of the defect, an Abbe flap was deemed most appropriate.  Using a sterile surgical marker, an appropriate Abbe flap was measured and drawn on the lower lip. Local anesthesia was then infiltrated. A scalpel was then used to incise the upper lip through and through the skin, vermilion, muscle and mucosa, leaving the flap pedicled on the opposite side.  The flap was then rotated and transferred to the lower lip defect.  The flap was then sutured into place with a three layer technique, closing the orbicularis oris muscle layer with subcutaneous buried sutures, followed by a mucosal layer and an epidermal layer. Estlander Flap (Upper To Lower Lip) Text: The defect of the lower lip was assessed and measured.  Given the location and size of the defect, an Estlander flap was deemed most appropriate.  Using a sterile surgical marker, an appropriate Estlander flap was measured and drawn on the upper lip. Local anesthesia was then infiltrated. A scalpel was then used to incise the lateral aspect of the flap, through skin, muscle and mucosa, leaving the flap pedicled medially.  The flap was then rotated and positioned to fill the lower lip defect.  The flap was then sutured into place with a three layer technique, closing the orbicularis oris muscle layer with subcutaneous buried sutures, followed by a mucosal layer and an epidermal layer. Lip Wedge Excision Repair Text: Given the location of the defect and the proximity to free margins a full thickness wedge repair was deemed most appropriate.  Using a sterile surgical marker, the appropriate repair was drawn incorporating the defect and placing the expected incisions perpendicular to the vermilion border.  The vermilion border was also meticulously outlined to ensure appropriate reapproximation during the repair.  The area thus outlined was incised through and through with a #15 scalpel blade.  The muscularis and dermis were reaproximated with deep sutures following hemostasis. Care was taken to realign the vermilion border before proceeding with the superficial closure.  Once the vermilion was realigned the superfical and mucosal closure was finished. Ftsg Text: The defect edges were debeveled with a #15 scalpel blade.  Given the location of the defect, shape of the defect and the proximity to free margins a full thickness skin graft was deemed most appropriate.  Using a sterile surgical marker, the primary defect shape was transferred to the donor site. The area thus outlined was incised deep to adipose tissue with a #15 scalpel blade.  The harvested graft was then trimmed of adipose tissue until only dermis and epidermis was left.  The skin margins of the secondary defect were undermined to an appropriate distance in all directions utilizing iris scissors.  The secondary defect was closed with interrupted buried subcutaneous sutures.  The skin edges were then re-apposed with running  sutures.  The skin graft was then placed in the primary defect and oriented appropriately. Split-Thickness Skin Graft Text: The defect edges were debeveled with a #15 scalpel blade.  Given the location of the defect, shape of the defect and the proximity to free margins a split thickness skin graft was deemed most appropriate.  Using a sterile surgical marker, the primary defect shape was transferred to the donor site. The split thickness graft was then harvested.  The skin graft was then placed in the primary defect and oriented appropriately. Burow's Graft Text: The defect edges were debeveled with a #15 scalpel blade.  Given the location of the defect, shape of the defect, the proximity to free margins and the presence of a standing cone deformity a Burow's skin graft was deemed most appropriate. The standing cone was removed and this tissue was then trimmed to the shape of the primary defect. The adipose tissue was also removed until only dermis and epidermis were left.  The skin margins of the secondary defect were undermined to an appropriate distance in all directions utilizing iris scissors.  The secondary defect was closed with interrupted buried subcutaneous sutures.  The skin edges were then re-apposed with running  sutures.  The skin graft was then placed in the primary defect and oriented appropriately. Cartilage Graft Text: The defect edges were debeveled with a #15 scalpel blade.  Given the location of the defect, shape of the defect, the fact the defect involved a full thickness cartilage defect a cartilage graft was deemed most appropriate.  An appropriate donor site was identified, cleansed, and anesthetized. The cartilage graft was then harvested and transferred to the recipient site, oriented appropriately and then sutured into place.  The secondary defect was then repaired using a primary closure. Composite Graft Text: The defect edges were debeveled with a #15 scalpel blade.  Given the location of the defect, shape of the defect, the proximity to free margins and the fact the defect was full thickness a composite graft was deemed most appropriate.  The defect was outline and then transferred to the donor site.  A full thickness graft was then excised from the donor site. The graft was then placed in the primary defect, oriented appropriately and then sutured into place.  The secondary defect was then repaired using a primary closure. Epidermal Autograft Text: The defect edges were debeveled with a #15 scalpel blade.  Given the location of the defect, shape of the defect and the proximity to free margins an epidermal autograft was deemed most appropriate.  Using a sterile surgical marker, the primary defect shape was transferred to the donor site. The epidermal graft was then harvested.  The skin graft was then placed in the primary defect and oriented appropriately. Dermal Autograft Text: The defect edges were debeveled with a #15 scalpel blade.  Given the location of the defect, shape of the defect and the proximity to free margins a dermal autograft was deemed most appropriate.  Using a sterile surgical marker, the primary defect shape was transferred to the donor site. The area thus outlined was incised deep to adipose tissue with a #15 scalpel blade.  The harvested graft was then trimmed of adipose and epidermal tissue until only dermis was left.  The skin graft was then placed in the primary defect and oriented appropriately. Skin Substitute Text: The defect edges were debeveled with a #15 scalpel blade.  Given the location of the defect, shape of the defect and the proximity to free margins a skin substitute graft was deemed most appropriate.  The graft material was trimmed to fit the size of the defect. The graft was then placed in the primary defect and oriented appropriately. Tissue Cultured Epidermal Autograft Text: The defect edges were debeveled with a #15 scalpel blade.  Given the location of the defect, shape of the defect and the proximity to free margins a tissue cultured epidermal autograft was deemed most appropriate.  The graft was then trimmed to fit the size of the defect.  The graft was then placed in the primary defect and oriented appropriately. Xenograft Text: The defect edges were debeveled with a #15 scalpel blade.  Given the location of the defect, shape of the defect and the proximity to free margins a xenograft was deemed most appropriate.  The graft was then trimmed to fit the size of the defect.  The graft was then placed in the primary defect and oriented appropriately. Purse String (Intermediate) Text: Given the location of the defect and the characteristics of the surrounding skin a purse string intermediate closure was deemed most appropriate.  Undermining was performed circumferentially around the surgical defect.  A purse string suture was then placed and tightened. Purse String (Simple) Text: Given the location of the defect and the characteristics of the surrounding skin a purse string simple closure was deemed most appropriate.  Undermining was performed circumferentially around the surgical defect.  A purse string suture was then placed and tightened. Complex Repair And Single Advancement Flap Text: The defect edges were debeveled with a #15 scalpel blade.  The primary defect was closed partially with a complex linear closure.  Given the location of the remaining defect, shape of the defect and the proximity to free margins a single advancement flap was deemed most appropriate for complete closure of the defect.  Using a sterile surgical marker, an appropriate advancement flap was drawn incorporating the defect and placing the expected incisions within the relaxed skin tension lines where possible.    The area thus outlined was incised deep to adipose tissue with a #15 scalpel blade.  The skin margins were undermined to an appropriate distance in all directions utilizing iris scissors. Complex Repair And Double Advancement Flap Text: The defect edges were debeveled with a #15 scalpel blade.  The primary defect was closed partially with a complex linear closure.  Given the location of the remaining defect, shape of the defect and the proximity to free margins a double advancement flap was deemed most appropriate for complete closure of the defect.  Using a sterile surgical marker, an appropriate advancement flap was drawn incorporating the defect and placing the expected incisions within the relaxed skin tension lines where possible.    The area thus outlined was incised deep to adipose tissue with a #15 scalpel blade.  The skin margins were undermined to an appropriate distance in all directions utilizing iris scissors. Complex Repair And Modified Advancement Flap Text: The defect edges were debeveled with a #15 scalpel blade.  The primary defect was closed partially with a complex linear closure.  Given the location of the remaining defect, shape of the defect and the proximity to free margins a modified advancement flap was deemed most appropriate for complete closure of the defect.  Using a sterile surgical marker, an appropriate advancement flap was drawn incorporating the defect and placing the expected incisions within the relaxed skin tension lines where possible.    The area thus outlined was incised deep to adipose tissue with a #15 scalpel blade.  The skin margins were undermined to an appropriate distance in all directions utilizing iris scissors. Complex Repair And A-T Advancement Flap Text: The defect edges were debeveled with a #15 scalpel blade.  The primary defect was closed partially with a complex linear closure.  Given the location of the remaining defect, shape of the defect and the proximity to free margins an A-T advancement flap was deemed most appropriate for complete closure of the defect.  Using a sterile surgical marker, an appropriate advancement flap was drawn incorporating the defect and placing the expected incisions within the relaxed skin tension lines where possible.    The area thus outlined was incised deep to adipose tissue with a #15 scalpel blade.  The skin margins were undermined to an appropriate distance in all directions utilizing iris scissors. Complex Repair And O-T Advancement Flap Text: The defect edges were debeveled with a #15 scalpel blade.  The primary defect was closed partially with a complex linear closure.  Given the location of the remaining defect, shape of the defect and the proximity to free margins an O-T advancement flap was deemed most appropriate for complete closure of the defect.  Using a sterile surgical marker, an appropriate advancement flap was drawn incorporating the defect and placing the expected incisions within the relaxed skin tension lines where possible.    The area thus outlined was incised deep to adipose tissue with a #15 scalpel blade.  The skin margins were undermined to an appropriate distance in all directions utilizing iris scissors. Complex Repair And O-L Flap Text: The defect edges were debeveled with a #15 scalpel blade.  The primary defect was closed partially with a complex linear closure.  Given the location of the remaining defect, shape of the defect and the proximity to free margins an O-L flap was deemed most appropriate for complete closure of the defect.  Using a sterile surgical marker, an appropriate flap was drawn incorporating the defect and placing the expected incisions within the relaxed skin tension lines where possible.    The area thus outlined was incised deep to adipose tissue with a #15 scalpel blade.  The skin margins were undermined to an appropriate distance in all directions utilizing iris scissors. Complex Repair And Bilobe Flap Text: The defect edges were debeveled with a #15 scalpel blade.  The primary defect was closed partially with a complex linear closure.  Given the location of the remaining defect, shape of the defect and the proximity to free margins a bilobe flap was deemed most appropriate for complete closure of the defect.  Using a sterile surgical marker, an appropriate advancement flap was drawn incorporating the defect and placing the expected incisions within the relaxed skin tension lines where possible.    The area thus outlined was incised deep to adipose tissue with a #15 scalpel blade.  The skin margins were undermined to an appropriate distance in all directions utilizing iris scissors. Complex Repair And Melolabial Flap Text: The defect edges were debeveled with a #15 scalpel blade.  The primary defect was closed partially with a complex linear closure.  Given the location of the remaining defect, shape of the defect and the proximity to free margins a melolabial flap was deemed most appropriate for complete closure of the defect.  Using a sterile surgical marker, an appropriate advancement flap was drawn incorporating the defect and placing the expected incisions within the relaxed skin tension lines where possible.    The area thus outlined was incised deep to adipose tissue with a #15 scalpel blade.  The skin margins were undermined to an appropriate distance in all directions utilizing iris scissors. Complex Repair And Rotation Flap Text: The defect edges were debeveled with a #15 scalpel blade.  The primary defect was closed partially with a complex linear closure.  Given the location of the remaining defect, shape of the defect and the proximity to free margins a rotation flap was deemed most appropriate for complete closure of the defect.  Using a sterile surgical marker, an appropriate advancement flap was drawn incorporating the defect and placing the expected incisions within the relaxed skin tension lines where possible.    The area thus outlined was incised deep to adipose tissue with a #15 scalpel blade.  The skin margins were undermined to an appropriate distance in all directions utilizing iris scissors. Complex Repair And Rhombic Flap Text: The defect edges were debeveled with a #15 scalpel blade.  The primary defect was closed partially with a complex linear closure.  Given the location of the remaining defect, shape of the defect and the proximity to free margins a rhombic flap was deemed most appropriate for complete closure of the defect.  Using a sterile surgical marker, an appropriate advancement flap was drawn incorporating the defect and placing the expected incisions within the relaxed skin tension lines where possible.    The area thus outlined was incised deep to adipose tissue with a #15 scalpel blade.  The skin margins were undermined to an appropriate distance in all directions utilizing iris scissors. Complex Repair And Transposition Flap Text: The defect edges were debeveled with a #15 scalpel blade.  The primary defect was closed partially with a complex linear closure.  Given the location of the remaining defect, shape of the defect and the proximity to free margins a transposition flap was deemed most appropriate for complete closure of the defect.  Using a sterile surgical marker, an appropriate advancement flap was drawn incorporating the defect and placing the expected incisions within the relaxed skin tension lines where possible.    The area thus outlined was incised deep to adipose tissue with a #15 scalpel blade.  The skin margins were undermined to an appropriate distance in all directions utilizing iris scissors. Complex Repair And V-Y Plasty Text: The defect edges were debeveled with a #15 scalpel blade.  The primary defect was closed partially with a complex linear closure.  Given the location of the remaining defect, shape of the defect and the proximity to free margins a V-Y plasty was deemed most appropriate for complete closure of the defect.  Using a sterile surgical marker, an appropriate advancement flap was drawn incorporating the defect and placing the expected incisions within the relaxed skin tension lines where possible.    The area thus outlined was incised deep to adipose tissue with a #15 scalpel blade.  The skin margins were undermined to an appropriate distance in all directions utilizing iris scissors. Complex Repair And M Plasty Text: The defect edges were debeveled with a #15 scalpel blade.  The primary defect was closed partially with a complex linear closure.  Given the location of the remaining defect, shape of the defect and the proximity to free margins an M plasty was deemed most appropriate for complete closure of the defect.  Using a sterile surgical marker, an appropriate advancement flap was drawn incorporating the defect and placing the expected incisions within the relaxed skin tension lines where possible.    The area thus outlined was incised deep to adipose tissue with a #15 scalpel blade.  The skin margins were undermined to an appropriate distance in all directions utilizing iris scissors. Complex Repair And Double M Plasty Text: The defect edges were debeveled with a #15 scalpel blade.  The primary defect was closed partially with a complex linear closure.  Given the location of the remaining defect, shape of the defect and the proximity to free margins a double M plasty was deemed most appropriate for complete closure of the defect.  Using a sterile surgical marker, an appropriate advancement flap was drawn incorporating the defect and placing the expected incisions within the relaxed skin tension lines where possible.    The area thus outlined was incised deep to adipose tissue with a #15 scalpel blade.  The skin margins were undermined to an appropriate distance in all directions utilizing iris scissors. Complex Repair And W Plasty Text: The defect edges were debeveled with a #15 scalpel blade.  The primary defect was closed partially with a complex linear closure.  Given the location of the remaining defect, shape of the defect and the proximity to free margins a W plasty was deemed most appropriate for complete closure of the defect.  Using a sterile surgical marker, an appropriate advancement flap was drawn incorporating the defect and placing the expected incisions within the relaxed skin tension lines where possible.    The area thus outlined was incised deep to adipose tissue with a #15 scalpel blade.  The skin margins were undermined to an appropriate distance in all directions utilizing iris scissors. Complex Repair And Z Plasty Text: The defect edges were debeveled with a #15 scalpel blade.  The primary defect was closed partially with a complex linear closure.  Given the location of the remaining defect, shape of the defect and the proximity to free margins a Z plasty was deemed most appropriate for complete closure of the defect.  Using a sterile surgical marker, an appropriate advancement flap was drawn incorporating the defect and placing the expected incisions within the relaxed skin tension lines where possible.    The area thus outlined was incised deep to adipose tissue with a #15 scalpel blade.  The skin margins were undermined to an appropriate distance in all directions utilizing iris scissors. Complex Repair And Dorsal Nasal Flap Text: The defect edges were debeveled with a #15 scalpel blade.  The primary defect was closed partially with a complex linear closure.  Given the location of the remaining defect, shape of the defect and the proximity to free margins a dorsal nasal flap was deemed most appropriate for complete closure of the defect.  Using a sterile surgical marker, an appropriate flap was drawn incorporating the defect and placing the expected incisions within the relaxed skin tension lines where possible.    The area thus outlined was incised deep to adipose tissue with a #15 scalpel blade.  The skin margins were undermined to an appropriate distance in all directions utilizing iris scissors. Complex Repair And Ftsg Text: The defect edges were debeveled with a #15 scalpel blade.  The primary defect was closed partially with a complex linear closure.  Given the location of the defect, shape of the defect and the proximity to free margins a full thickness skin graft was deemed most appropriate to repair the remaining defect.  The graft was trimmed to fit the size of the remaining defect.  The graft was then placed in the primary defect, oriented appropriately, and sutured into place. Complex Repair And Burow's Graft Text: The defect edges were debeveled with a #15 scalpel blade.  The primary defect was closed partially with a complex linear closure.  Given the location of the defect, shape of the defect, the proximity to free margins and the presence of a standing cone deformity a Burow's graft was deemed most appropriate to repair the remaining defect.  The graft was trimmed to fit the size of the remaining defect.  The graft was then placed in the primary defect, oriented appropriately, and sutured into place. Complex Repair And Split-Thickness Skin Graft Text: The defect edges were debeveled with a #15 scalpel blade.  The primary defect was closed partially with a complex linear closure.  Given the location of the defect, shape of the defect and the proximity to free margins a split thickness skin graft was deemed most appropriate to repair the remaining defect.  The graft was trimmed to fit the size of the remaining defect.  The graft was then placed in the primary defect, oriented appropriately, and sutured into place. Complex Repair And Epidermal Autograft Text: The defect edges were debeveled with a #15 scalpel blade.  The primary defect was closed partially with a complex linear closure.  Given the location of the defect, shape of the defect and the proximity to free margins an epidermal autograft was deemed most appropriate to repair the remaining defect.  The graft was trimmed to fit the size of the remaining defect.  The graft was then placed in the primary defect, oriented appropriately, and sutured into place. Complex Repair And Dermal Autograft Text: The defect edges were debeveled with a #15 scalpel blade.  The primary defect was closed partially with a complex linear closure.  Given the location of the defect, shape of the defect and the proximity to free margins an dermal autograft was deemed most appropriate to repair the remaining defect.  The graft was trimmed to fit the size of the remaining defect.  The graft was then placed in the primary defect, oriented appropriately, and sutured into place. Complex Repair And Tissue Cultured Epidermal Autograft Text: The defect edges were debeveled with a #15 scalpel blade.  The primary defect was closed partially with a complex linear closure.  Given the location of the defect, shape of the defect and the proximity to free margins an tissue cultured epidermal autograft was deemed most appropriate to repair the remaining defect.  The graft was trimmed to fit the size of the remaining defect.  The graft was then placed in the primary defect, oriented appropriately, and sutured into place. Complex Repair And Xenograft Text: The defect edges were debeveled with a #15 scalpel blade.  The primary defect was closed partially with a complex linear closure.  Given the location of the defect, shape of the defect and the proximity to free margins a xenograft was deemed most appropriate to repair the remaining defect.  The graft was trimmed to fit the size of the remaining defect.  The graft was then placed in the primary defect, oriented appropriately, and sutured into place. Complex Repair And Skin Substitute Graft Text: The defect edges were debeveled with a #15 scalpel blade.  The primary defect was closed partially with a complex linear closure.  Given the location of the remaining defect, shape of the defect and the proximity to free margins a skin substitute graft was deemed most appropriate to repair the remaining defect.  The graft was trimmed to fit the size of the remaining defect.  The graft was then placed in the primary defect, oriented appropriately, and sutured into place. Path Notes (To The Dermatopathologist): Nevus with severe atypia, positive margins. Consent was obtained from the patient. The risks and benefits to therapy were discussed in detail. Specifically, the risks of infection, scarring, bleeding, prolonged wound healing, incomplete removal, allergy to anesthesia, nerve injury and recurrence were addressed. Prior to the procedure, the treatment site was clearly identified and confirmed by the patient. All components of Universal Protocol/PAUSE Rule completed. Post-Care Instructions: I reviewed with the patient in detail post-care instructions. Patient is not to engage in any heavy lifting, exercise, or swimming for the next 14 days. Should the patient develop any fevers, chills, bleeding, severe pain patient will contact the office immediately. Home Suture Removal Text: Patient was provided a home suture removal kit and will remove their sutures at home.  If they have any questions or difficulties they will call the office. Where Do You Want The Question To Include Opioid Counseling Located?: Case Summary Tab Billing Type: Third-Party Bill Information: Selecting Yes will display possible errors in your note based on the variables you have selected. This validation is only offered as a suggestion for you. PLEASE NOTE THAT THE VALIDATION TEXT WILL BE REMOVED WHEN YOU FINALIZE YOUR NOTE. IF YOU WANT TO FAX A PRELIMINARY NOTE YOU WILL NEED TO TOGGLE THIS TO 'NO' IF YOU DO NOT WANT IT IN YOUR FAXED NOTE.
